# Patient Record
Sex: MALE | HISPANIC OR LATINO | URBAN - METROPOLITAN AREA
[De-identification: names, ages, dates, MRNs, and addresses within clinical notes are randomized per-mention and may not be internally consistent; named-entity substitution may affect disease eponyms.]

---

## 2022-06-20 ENCOUNTER — ANESTHESIA EVENT (OUTPATIENT)
Dept: SURGERY | Facility: HOSPITAL | Age: 37
DRG: 326 | End: 2022-06-20

## 2022-06-20 ENCOUNTER — ANESTHESIA (OUTPATIENT)
Dept: SURGERY | Facility: HOSPITAL | Age: 37
DRG: 326 | End: 2022-06-20

## 2022-06-20 ENCOUNTER — HOSPITAL ENCOUNTER (INPATIENT)
Facility: HOSPITAL | Age: 37
LOS: 3 days | Discharge: HOME | DRG: 326 | End: 2022-06-23
Attending: STUDENT IN AN ORGANIZED HEALTH CARE EDUCATION/TRAINING PROGRAM | Admitting: SURGERY

## 2022-06-20 DIAGNOSIS — R10.9 ABDOMINAL PAIN: ICD-10-CM

## 2022-06-20 DIAGNOSIS — F10.920 ALCOHOLIC INTOXICATION WITHOUT COMPLICATION: ICD-10-CM

## 2022-06-20 DIAGNOSIS — R19.8 PERFORATED VISCUS: Primary | ICD-10-CM

## 2022-06-20 DIAGNOSIS — R10.0 ACUTE ABDOMEN: ICD-10-CM

## 2022-06-20 PROBLEM — K25.1 ACUTE GASTRIC ULCER WITH PERFORATION: Status: ACTIVE | Noted: 2022-06-20

## 2022-06-20 PROBLEM — D72.829 LEUKOCYTOSIS: Status: ACTIVE | Noted: 2022-06-20

## 2022-06-20 PROBLEM — F10.10 ETOH ABUSE: Chronic | Status: ACTIVE | Noted: 2022-06-20

## 2022-06-20 LAB
ABO + RH BLD: NORMAL
ALBUMIN SERPL BCP-MCNC: 4.1 G/DL (ref 3.5–5.2)
ALP SERPL-CCNC: 80 U/L (ref 55–135)
ALT SERPL W/O P-5'-P-CCNC: 19 U/L (ref 10–44)
AMPHET+METHAMPHET UR QL: NEGATIVE
ANION GAP SERPL CALC-SCNC: 14 MMOL/L (ref 8–16)
ANION GAP SERPL CALC-SCNC: 16 MMOL/L (ref 8–16)
APTT BLDCRRT: 24.2 SEC (ref 21–32)
AST SERPL-CCNC: 42 U/L (ref 10–40)
BARBITURATES UR QL SCN>200 NG/ML: NEGATIVE
BASOPHILS # BLD AUTO: 0.05 K/UL (ref 0–0.2)
BASOPHILS NFR BLD: 0.4 % (ref 0–1.9)
BENZODIAZ UR QL SCN>200 NG/ML: NEGATIVE
BILIRUB SERPL-MCNC: 0.7 MG/DL (ref 0.1–1)
BILIRUB UR QL STRIP: NEGATIVE
BLD GP AB SCN CELLS X3 SERPL QL: NORMAL
BUN SERPL-MCNC: 7 MG/DL (ref 6–20)
BUN SERPL-MCNC: 7 MG/DL (ref 6–30)
BZE UR QL SCN: ABNORMAL
CALCIUM SERPL-MCNC: 8.2 MG/DL (ref 8.7–10.5)
CANNABINOIDS UR QL SCN: ABNORMAL
CHLORIDE SERPL-SCNC: 101 MMOL/L (ref 95–110)
CHLORIDE SERPL-SCNC: 104 MMOL/L (ref 95–110)
CLARITY UR: CLEAR
CO2 SERPL-SCNC: 23 MMOL/L (ref 23–29)
COLOR UR: YELLOW
CREAT SERPL-MCNC: 0.8 MG/DL (ref 0.5–1.4)
CREAT SERPL-MCNC: 1.1 MG/DL (ref 0.5–1.4)
CREAT UR-MCNC: 78.9 MG/DL (ref 23–375)
CTP QC/QA: YES
DIFFERENTIAL METHOD: ABNORMAL
EOSINOPHIL # BLD AUTO: 0.2 K/UL (ref 0–0.5)
EOSINOPHIL NFR BLD: 1.1 % (ref 0–8)
ERYTHROCYTE [DISTWIDTH] IN BLOOD BY AUTOMATED COUNT: 13.2 % (ref 11.5–14.5)
EST. GFR  (AFRICAN AMERICAN): >60 ML/MIN/1.73 M^2
EST. GFR  (NON AFRICAN AMERICAN): >60 ML/MIN/1.73 M^2
ETHANOL SERPL-MCNC: 243 MG/DL
GLUCOSE SERPL-MCNC: 107 MG/DL (ref 70–110)
GLUCOSE SERPL-MCNC: 107 MG/DL (ref 70–110)
GLUCOSE UR QL STRIP: NEGATIVE
HCT VFR BLD AUTO: 42.6 % (ref 40–54)
HCT VFR BLD CALC: 45 %PCV (ref 36–54)
HGB BLD-MCNC: 14.6 G/DL (ref 14–18)
HGB UR QL STRIP: NEGATIVE
IMM GRANULOCYTES # BLD AUTO: 0.05 K/UL (ref 0–0.04)
IMM GRANULOCYTES NFR BLD AUTO: 0.4 % (ref 0–0.5)
INR PPP: 1 (ref 0.8–1.2)
KETONES UR QL STRIP: NEGATIVE
LACTATE SERPL-SCNC: 2.7 MMOL/L (ref 0.5–2.2)
LEUKOCYTE ESTERASE UR QL STRIP: NEGATIVE
LIPASE SERPL-CCNC: 19 U/L (ref 4–60)
LYMPHOCYTES # BLD AUTO: 2.9 K/UL (ref 1–4.8)
LYMPHOCYTES NFR BLD: 20.3 % (ref 18–48)
MCH RBC QN AUTO: 33 PG (ref 27–31)
MCHC RBC AUTO-ENTMCNC: 34.3 G/DL (ref 32–36)
MCV RBC AUTO: 96 FL (ref 82–98)
METHADONE UR QL SCN>300 NG/ML: NEGATIVE
MONOCYTES # BLD AUTO: 0.9 K/UL (ref 0.3–1)
MONOCYTES NFR BLD: 6.5 % (ref 4–15)
NEUTROPHILS # BLD AUTO: 10 K/UL (ref 1.8–7.7)
NEUTROPHILS NFR BLD: 71.3 % (ref 38–73)
NITRITE UR QL STRIP: NEGATIVE
NRBC BLD-RTO: 0 /100 WBC
OPIATES UR QL SCN: NEGATIVE
PCP UR QL SCN>25 NG/ML: NEGATIVE
PH UR STRIP: 6 [PH] (ref 5–8)
PLATELET # BLD AUTO: 208 K/UL (ref 150–450)
PMV BLD AUTO: 10.2 FL (ref 9.2–12.9)
POC IONIZED CALCIUM: 1.2 MMOL/L (ref 1.06–1.42)
POC TCO2 (MEASURED): 28 MMOL/L (ref 23–29)
POTASSIUM BLD-SCNC: 3.8 MMOL/L (ref 3.5–5.1)
POTASSIUM SERPL-SCNC: 3.9 MMOL/L (ref 3.5–5.1)
PROCALCITONIN SERPL IA-MCNC: 1.01 NG/ML
PROT SERPL-MCNC: 7.3 G/DL (ref 6–8.4)
PROT UR QL STRIP: NEGATIVE
PROTHROMBIN TIME: 10.4 SEC (ref 9–12.5)
RBC # BLD AUTO: 4.43 M/UL (ref 4.6–6.2)
SAMPLE: NORMAL
SARS-COV-2 RDRP RESP QL NAA+PROBE: NEGATIVE
SODIUM BLD-SCNC: 141 MMOL/L (ref 136–145)
SODIUM SERPL-SCNC: 141 MMOL/L (ref 136–145)
SP GR UR STRIP: >1.03 (ref 1–1.03)
TOXICOLOGY INFORMATION: ABNORMAL
URN SPEC COLLECT METH UR: ABNORMAL
UROBILINOGEN UR STRIP-ACNC: NEGATIVE EU/DL
WBC # BLD AUTO: 14.05 K/UL (ref 3.9–12.7)

## 2022-06-20 PROCEDURE — P9612 CATHETERIZE FOR URINE SPEC: HCPCS

## 2022-06-20 PROCEDURE — 80047 BASIC METABLC PNL IONIZED CA: CPT

## 2022-06-20 PROCEDURE — 80053 COMPREHEN METABOLIC PANEL: CPT | Performed by: STUDENT IN AN ORGANIZED HEALTH CARE EDUCATION/TRAINING PROGRAM

## 2022-06-20 PROCEDURE — 93005 ELECTROCARDIOGRAM TRACING: CPT

## 2022-06-20 PROCEDURE — 63600175 PHARM REV CODE 636 W HCPCS: Performed by: NURSE ANESTHETIST, CERTIFIED REGISTERED

## 2022-06-20 PROCEDURE — 96374 THER/PROPH/DIAG INJ IV PUSH: CPT

## 2022-06-20 PROCEDURE — 85730 THROMBOPLASTIN TIME PARTIAL: CPT | Performed by: EMERGENCY MEDICINE

## 2022-06-20 PROCEDURE — 83605 ASSAY OF LACTIC ACID: CPT | Performed by: EMERGENCY MEDICINE

## 2022-06-20 PROCEDURE — 96375 TX/PRO/DX INJ NEW DRUG ADDON: CPT

## 2022-06-20 PROCEDURE — 63600175 PHARM REV CODE 636 W HCPCS: Performed by: STUDENT IN AN ORGANIZED HEALTH CARE EDUCATION/TRAINING PROGRAM

## 2022-06-20 PROCEDURE — 85610 PROTHROMBIN TIME: CPT | Performed by: EMERGENCY MEDICINE

## 2022-06-20 PROCEDURE — 63600175 PHARM REV CODE 636 W HCPCS: Performed by: SURGERY

## 2022-06-20 PROCEDURE — 11000001 HC ACUTE MED/SURG PRIVATE ROOM

## 2022-06-20 PROCEDURE — 80307 DRUG TEST PRSMV CHEM ANLYZR: CPT | Performed by: STUDENT IN AN ORGANIZED HEALTH CARE EDUCATION/TRAINING PROGRAM

## 2022-06-20 PROCEDURE — 25000003 PHARM REV CODE 250: Performed by: NURSE ANESTHETIST, CERTIFIED REGISTERED

## 2022-06-20 PROCEDURE — 27201423 OPTIME MED/SURG SUP & DEVICES STERILE SUPPLY: Performed by: SURGERY

## 2022-06-20 PROCEDURE — 82077 ASSAY SPEC XCP UR&BREATH IA: CPT | Performed by: STUDENT IN AN ORGANIZED HEALTH CARE EDUCATION/TRAINING PROGRAM

## 2022-06-20 PROCEDURE — 86850 RBC ANTIBODY SCREEN: CPT | Performed by: EMERGENCY MEDICINE

## 2022-06-20 PROCEDURE — 63600175 PHARM REV CODE 636 W HCPCS: Performed by: ANESTHESIOLOGY

## 2022-06-20 PROCEDURE — 81003 URINALYSIS AUTO W/O SCOPE: CPT | Mod: 59 | Performed by: STUDENT IN AN ORGANIZED HEALTH CARE EDUCATION/TRAINING PROGRAM

## 2022-06-20 PROCEDURE — 85025 COMPLETE CBC W/AUTO DIFF WBC: CPT | Performed by: STUDENT IN AN ORGANIZED HEALTH CARE EDUCATION/TRAINING PROGRAM

## 2022-06-20 PROCEDURE — 83690 ASSAY OF LIPASE: CPT | Performed by: STUDENT IN AN ORGANIZED HEALTH CARE EDUCATION/TRAINING PROGRAM

## 2022-06-20 PROCEDURE — 49905 OMENTAL FLAP INTRA-ABDOM: CPT | Mod: ,,, | Performed by: SURGERY

## 2022-06-20 PROCEDURE — 43840 GSTRRPHY SUTR DUOL/GSTR ULCR: CPT | Mod: ,,, | Performed by: SURGERY

## 2022-06-20 PROCEDURE — D9220A PRA ANESTHESIA: ICD-10-PCS | Mod: ,,, | Performed by: ANESTHESIOLOGY

## 2022-06-20 PROCEDURE — 25500020 PHARM REV CODE 255: Performed by: EMERGENCY MEDICINE

## 2022-06-20 PROCEDURE — 63600175 PHARM REV CODE 636 W HCPCS: Performed by: EMERGENCY MEDICINE

## 2022-06-20 PROCEDURE — 84145 PROCALCITONIN (PCT): CPT | Performed by: EMERGENCY MEDICINE

## 2022-06-20 PROCEDURE — 96372 THER/PROPH/DIAG INJ SC/IM: CPT | Mod: 59 | Performed by: EMERGENCY MEDICINE

## 2022-06-20 PROCEDURE — U0002 COVID-19 LAB TEST NON-CDC: HCPCS | Performed by: EMERGENCY MEDICINE

## 2022-06-20 PROCEDURE — D9220A PRA ANESTHESIA: Mod: ,,, | Performed by: ANESTHESIOLOGY

## 2022-06-20 PROCEDURE — 99291 CRITICAL CARE FIRST HOUR: CPT | Mod: 25

## 2022-06-20 PROCEDURE — C1729 CATH, DRAINAGE: HCPCS | Performed by: SURGERY

## 2022-06-20 PROCEDURE — 36000706: Performed by: SURGERY

## 2022-06-20 PROCEDURE — 25000003 PHARM REV CODE 250: Performed by: EMERGENCY MEDICINE

## 2022-06-20 PROCEDURE — 49905 PR OMENTAL FLAP,INTRA-ABDOMINAL: ICD-10-PCS | Mod: ,,, | Performed by: SURGERY

## 2022-06-20 PROCEDURE — 43840 PR REPAIR, PERF DUOD/GAST ULC-WND/INJ: ICD-10-PCS | Mod: ,,, | Performed by: SURGERY

## 2022-06-20 PROCEDURE — 36000707: Performed by: SURGERY

## 2022-06-20 PROCEDURE — 37000009 HC ANESTHESIA EA ADD 15 MINS: Performed by: SURGERY

## 2022-06-20 PROCEDURE — 71000033 HC RECOVERY, INTIAL HOUR: Performed by: SURGERY

## 2022-06-20 PROCEDURE — 37000008 HC ANESTHESIA 1ST 15 MINUTES: Performed by: SURGERY

## 2022-06-20 PROCEDURE — 63600175 PHARM REV CODE 636 W HCPCS: Performed by: HOSPITALIST

## 2022-06-20 PROCEDURE — 96361 HYDRATE IV INFUSION ADD-ON: CPT

## 2022-06-20 PROCEDURE — 25000003 PHARM REV CODE 250: Performed by: STUDENT IN AN ORGANIZED HEALTH CARE EDUCATION/TRAINING PROGRAM

## 2022-06-20 PROCEDURE — 93010 EKG 12-LEAD: ICD-10-PCS | Mod: ,,, | Performed by: INTERNAL MEDICINE

## 2022-06-20 PROCEDURE — 71000039 HC RECOVERY, EACH ADD'L HOUR: Performed by: SURGERY

## 2022-06-20 PROCEDURE — 87040 BLOOD CULTURE FOR BACTERIA: CPT | Mod: 59 | Performed by: EMERGENCY MEDICINE

## 2022-06-20 PROCEDURE — 25000242 PHARM REV CODE 250 ALT 637 W/ HCPCS: Performed by: NURSE ANESTHETIST, CERTIFIED REGISTERED

## 2022-06-20 PROCEDURE — 93010 ELECTROCARDIOGRAM REPORT: CPT | Mod: ,,, | Performed by: INTERNAL MEDICINE

## 2022-06-20 PROCEDURE — 99223 1ST HOSP IP/OBS HIGH 75: CPT | Mod: 57,,, | Performed by: SURGERY

## 2022-06-20 PROCEDURE — 99223 PR INITIAL HOSPITAL CARE,LEVL III: ICD-10-PCS | Mod: 57,,, | Performed by: SURGERY

## 2022-06-20 RX ORDER — PROPOFOL 10 MG/ML
VIAL (ML) INTRAVENOUS
Status: DISCONTINUED | OUTPATIENT
Start: 2022-06-20 | End: 2022-06-20

## 2022-06-20 RX ORDER — SODIUM CHLORIDE, SODIUM LACTATE, POTASSIUM CHLORIDE, CALCIUM CHLORIDE 600; 310; 30; 20 MG/100ML; MG/100ML; MG/100ML; MG/100ML
INJECTION, SOLUTION INTRAVENOUS CONTINUOUS
Status: DISCONTINUED | OUTPATIENT
Start: 2022-06-20 | End: 2022-06-22

## 2022-06-20 RX ORDER — LORAZEPAM 2 MG/ML
1 INJECTION INTRAMUSCULAR EVERY 4 HOURS PRN
Status: DISCONTINUED | OUTPATIENT
Start: 2022-06-20 | End: 2022-06-23

## 2022-06-20 RX ORDER — DEXAMETHASONE SODIUM PHOSPHATE 4 MG/ML
INJECTION, SOLUTION INTRA-ARTICULAR; INTRALESIONAL; INTRAMUSCULAR; INTRAVENOUS; SOFT TISSUE
Status: DISCONTINUED | OUTPATIENT
Start: 2022-06-20 | End: 2022-06-20

## 2022-06-20 RX ORDER — ACETAMINOPHEN 325 MG/1
650 TABLET ORAL EVERY 8 HOURS PRN
Status: DISCONTINUED | OUTPATIENT
Start: 2022-06-20 | End: 2022-06-20

## 2022-06-20 RX ORDER — HYDROMORPHONE HCL IN 0.9% NACL 6 MG/30 ML
PATIENT CONTROLLED ANALGESIA SYRINGE INTRAVENOUS CONTINUOUS
Status: DISCONTINUED | OUTPATIENT
Start: 2022-06-20 | End: 2022-06-20

## 2022-06-20 RX ORDER — NALOXONE HCL 0.4 MG/ML
0.02 VIAL (ML) INJECTION
Status: DISCONTINUED | OUTPATIENT
Start: 2022-06-20 | End: 2022-06-23 | Stop reason: HOSPADM

## 2022-06-20 RX ORDER — FENTANYL CITRATE 50 UG/ML
INJECTION, SOLUTION INTRAMUSCULAR; INTRAVENOUS
Status: DISCONTINUED | OUTPATIENT
Start: 2022-06-20 | End: 2022-06-20

## 2022-06-20 RX ORDER — ROCURONIUM BROMIDE 10 MG/ML
INJECTION, SOLUTION INTRAVENOUS
Status: DISCONTINUED | OUTPATIENT
Start: 2022-06-20 | End: 2022-06-20

## 2022-06-20 RX ORDER — ONDANSETRON 2 MG/ML
INJECTION INTRAMUSCULAR; INTRAVENOUS
Status: DISCONTINUED | OUTPATIENT
Start: 2022-06-20 | End: 2022-06-20

## 2022-06-20 RX ORDER — ONDANSETRON 4 MG/1
8 TABLET, ORALLY DISINTEGRATING ORAL EVERY 8 HOURS PRN
Status: DISCONTINUED | OUTPATIENT
Start: 2022-06-20 | End: 2022-06-20

## 2022-06-20 RX ORDER — SUCCINYLCHOLINE CHLORIDE 20 MG/ML
INJECTION INTRAMUSCULAR; INTRAVENOUS
Status: DISCONTINUED | OUTPATIENT
Start: 2022-06-20 | End: 2022-06-20

## 2022-06-20 RX ORDER — HYDROMORPHONE HYDROCHLORIDE 2 MG/ML
0.5 INJECTION, SOLUTION INTRAMUSCULAR; INTRAVENOUS; SUBCUTANEOUS
Status: COMPLETED | OUTPATIENT
Start: 2022-06-20 | End: 2022-06-20

## 2022-06-20 RX ORDER — ALBUTEROL SULFATE 90 UG/1
AEROSOL, METERED RESPIRATORY (INHALATION)
Status: DISCONTINUED | OUTPATIENT
Start: 2022-06-20 | End: 2022-06-20

## 2022-06-20 RX ORDER — MIDAZOLAM HYDROCHLORIDE 1 MG/ML
INJECTION, SOLUTION INTRAMUSCULAR; INTRAVENOUS
Status: DISCONTINUED | OUTPATIENT
Start: 2022-06-20 | End: 2022-06-20

## 2022-06-20 RX ORDER — FAMOTIDINE 20 MG/1
20 TABLET, FILM COATED ORAL
Status: COMPLETED | OUTPATIENT
Start: 2022-06-20 | End: 2022-06-20

## 2022-06-20 RX ORDER — LIDOCAINE HYDROCHLORIDE 20 MG/ML
INJECTION INTRAVENOUS
Status: DISCONTINUED | OUTPATIENT
Start: 2022-06-20 | End: 2022-06-20

## 2022-06-20 RX ORDER — TALC
6 POWDER (GRAM) TOPICAL NIGHTLY PRN
Status: DISCONTINUED | OUTPATIENT
Start: 2022-06-20 | End: 2022-06-20

## 2022-06-20 RX ORDER — ONDANSETRON 2 MG/ML
4 INJECTION INTRAMUSCULAR; INTRAVENOUS EVERY 6 HOURS PRN
Status: DISCONTINUED | OUTPATIENT
Start: 2022-06-20 | End: 2022-06-23 | Stop reason: HOSPADM

## 2022-06-20 RX ORDER — SODIUM CHLORIDE 0.9 % (FLUSH) 0.9 %
10 SYRINGE (ML) INJECTION
Status: DISCONTINUED | OUTPATIENT
Start: 2022-06-20 | End: 2022-06-20 | Stop reason: HOSPADM

## 2022-06-20 RX ORDER — FLUCONAZOLE 2 MG/ML
200 INJECTION, SOLUTION INTRAVENOUS
Status: DISCONTINUED | OUTPATIENT
Start: 2022-06-20 | End: 2022-06-23

## 2022-06-20 RX ORDER — HALOPERIDOL 5 MG/ML
2 INJECTION INTRAMUSCULAR
Status: COMPLETED | OUTPATIENT
Start: 2022-06-20 | End: 2022-06-20

## 2022-06-20 RX ORDER — ACETAMINOPHEN 10 MG/ML
1000 INJECTION, SOLUTION INTRAVENOUS ONCE
Status: COMPLETED | OUTPATIENT
Start: 2022-06-20 | End: 2022-06-20

## 2022-06-20 RX ORDER — FENTANYL CITRATE 50 UG/ML
25 INJECTION, SOLUTION INTRAMUSCULAR; INTRAVENOUS EVERY 5 MIN PRN
Status: DISCONTINUED | OUTPATIENT
Start: 2022-06-20 | End: 2022-06-20 | Stop reason: HOSPADM

## 2022-06-20 RX ORDER — HYDROMORPHONE HCL IN 0.9% NACL 6 MG/30 ML
PATIENT CONTROLLED ANALGESIA SYRINGE INTRAVENOUS CONTINUOUS
Status: DISCONTINUED | OUTPATIENT
Start: 2022-06-20 | End: 2022-06-23

## 2022-06-20 RX ORDER — ONDANSETRON 2 MG/ML
4 INJECTION INTRAMUSCULAR; INTRAVENOUS
Status: COMPLETED | OUTPATIENT
Start: 2022-06-20 | End: 2022-06-20

## 2022-06-20 RX ORDER — HYDROMORPHONE HYDROCHLORIDE 2 MG/ML
0.2 INJECTION, SOLUTION INTRAMUSCULAR; INTRAVENOUS; SUBCUTANEOUS EVERY 5 MIN PRN
Status: DISCONTINUED | OUTPATIENT
Start: 2022-06-20 | End: 2022-06-20 | Stop reason: HOSPADM

## 2022-06-20 RX ORDER — SODIUM CHLORIDE 0.9 % (FLUSH) 0.9 %
10 SYRINGE (ML) INJECTION
Status: DISCONTINUED | OUTPATIENT
Start: 2022-06-20 | End: 2022-06-23 | Stop reason: HOSPADM

## 2022-06-20 RX ADMIN — FENTANYL CITRATE 25 MCG: 50 INJECTION INTRAMUSCULAR; INTRAVENOUS at 12:06

## 2022-06-20 RX ADMIN — ONDANSETRON 4 MG: 2 INJECTION INTRAMUSCULAR; INTRAVENOUS at 05:06

## 2022-06-20 RX ADMIN — SODIUM CHLORIDE, SODIUM LACTATE, POTASSIUM CHLORIDE, AND CALCIUM CHLORIDE: .6; .31; .03; .02 INJECTION, SOLUTION INTRAVENOUS at 09:06

## 2022-06-20 RX ADMIN — DEXAMETHASONE SODIUM PHOSPHATE 4 MG: 4 INJECTION, SOLUTION INTRAMUSCULAR; INTRAVENOUS at 10:06

## 2022-06-20 RX ADMIN — SUGAMMADEX 200 MG: 100 INJECTION, SOLUTION INTRAVENOUS at 10:06

## 2022-06-20 RX ADMIN — HALOPERIDOL LACTATE 2 MG: 5 INJECTION, SOLUTION INTRAMUSCULAR at 06:06

## 2022-06-20 RX ADMIN — ACETAMINOPHEN 1000 MG: 10 INJECTION INTRAVENOUS at 11:06

## 2022-06-20 RX ADMIN — ROCURONIUM BROMIDE 50 MG: 10 INJECTION, SOLUTION INTRAVENOUS at 10:06

## 2022-06-20 RX ADMIN — PIPERACILLIN AND TAZOBACTAM 4.5 G: 4; .5 INJECTION, POWDER, FOR SOLUTION INTRAVENOUS at 05:06

## 2022-06-20 RX ADMIN — SUCCINYLCHOLINE CHLORIDE 120 MG: 20 INJECTION, SOLUTION INTRAMUSCULAR; INTRAVENOUS at 10:06

## 2022-06-20 RX ADMIN — THIAMINE HYDROCHLORIDE 500 MG: 100 INJECTION, SOLUTION INTRAMUSCULAR; INTRAVENOUS at 09:06

## 2022-06-20 RX ADMIN — Medication: at 01:06

## 2022-06-20 RX ADMIN — GLYCOPYRROLATE 0.2 MG: 0.2 INJECTION, SOLUTION INTRAMUSCULAR; INTRAVITREAL at 10:06

## 2022-06-20 RX ADMIN — IOHEXOL 85 ML: 300 INJECTION, SOLUTION INTRAVENOUS at 06:06

## 2022-06-20 RX ADMIN — SODIUM CHLORIDE 1000 ML: 0.9 INJECTION, SOLUTION INTRAVENOUS at 05:06

## 2022-06-20 RX ADMIN — LIDOCAINE HYDROCHLORIDE 100 MG: 20 INJECTION, SOLUTION INTRAVENOUS at 10:06

## 2022-06-20 RX ADMIN — FENTANYL CITRATE 100 MCG: 50 INJECTION, SOLUTION INTRAMUSCULAR; INTRAVENOUS at 10:06

## 2022-06-20 RX ADMIN — SODIUM CHLORIDE, SODIUM LACTATE, POTASSIUM CHLORIDE, AND CALCIUM CHLORIDE: .6; .31; .03; .02 INJECTION, SOLUTION INTRAVENOUS at 04:06

## 2022-06-20 RX ADMIN — PIPERACILLIN SODIUM AND TAZOBACTAM SODIUM 4.5 G: 4; .5 INJECTION, POWDER, LYOPHILIZED, FOR SOLUTION INTRAVENOUS at 08:06

## 2022-06-20 RX ADMIN — LORAZEPAM 1 MG: 2 INJECTION INTRAMUSCULAR; INTRAVENOUS at 05:06

## 2022-06-20 RX ADMIN — THIAMINE HYDROCHLORIDE 500 MG: 100 INJECTION, SOLUTION INTRAMUSCULAR; INTRAVENOUS at 04:06

## 2022-06-20 RX ADMIN — FAMOTIDINE 20 MG: 20 TABLET ORAL at 06:06

## 2022-06-20 RX ADMIN — PROPOFOL 40 MG: 10 INJECTION, EMULSION INTRAVENOUS at 10:06

## 2022-06-20 RX ADMIN — ONDANSETRON 4 MG: 2 INJECTION, SOLUTION INTRAMUSCULAR; INTRAVENOUS at 10:06

## 2022-06-20 RX ADMIN — HYDROMORPHONE HYDROCHLORIDE 0.5 MG: 2 INJECTION INTRAMUSCULAR; INTRAVENOUS; SUBCUTANEOUS at 05:06

## 2022-06-20 RX ADMIN — PROPOFOL 200 MG: 10 INJECTION, EMULSION INTRAVENOUS at 10:06

## 2022-06-20 RX ADMIN — ALBUTEROL SULFATE 4 PUFF: 90 AEROSOL, METERED RESPIRATORY (INHALATION) at 10:06

## 2022-06-20 RX ADMIN — MIDAZOLAM HYDROCHLORIDE 2 MG: 1 INJECTION, SOLUTION INTRAMUSCULAR; INTRAVENOUS at 09:06

## 2022-06-20 NOTE — ED PROVIDER NOTES
"Encounter Date: 6/20/2022    SCRIBE #1 NOTE: I, Melissa Nikki, am scribing for, and in the presence of, Viola Rojas DO.       History     Chief Complaint   Patient presents with    Abdominal Pain     Pt brought to ED by EMS. Pt c/o generalized abdominal pain without NVD x2 hours. Pt states he drank 2 beers and denies drug/tobacco use. Pt reports no PMHx, NKDA, and no daily medication use.      Jarad Figueroa is a 36 y.o. male, with no PMHx, who presents to the ED with abdominal pain. Patient reports severe, persistent generalized abdominal pain for the last 2h, stating "I can't stand it". No prior episode of symptoms. Patient currently denies EtOH, cigarette, or illicit drug use. No other exacerbating or alleviating factors.  Denies treatment of his symptoms prior to ED arrival.  Denies any urinary symptoms, nausea, vomiting, diarrhea, constipation, chest pain, or shortness of breath at this time. Patient is a poor historian due to acuity of condition. Denies previous surgeries.     Per EMS, patient reported he drank 2 beers. No reported drugs or tobacco use.       The history is provided by the patient. The history is limited by a language barrier and the condition of the patient. A  was used ( [id number]).     Review of patient's allergies indicates:  No Known Allergies  History reviewed. No pertinent past medical history.  History reviewed. No pertinent surgical history.  History reviewed. No pertinent family history.     Review of Systems   Unable to perform ROS: Acuity of condition   Respiratory: Negative for shortness of breath.    Cardiovascular: Negative for chest pain.   Gastrointestinal: Positive for abdominal pain. Negative for constipation, diarrhea, nausea and vomiting.   Genitourinary: Negative for difficulty urinating.       Physical Exam     Initial Vitals [06/20/22 0435]   BP Pulse Resp Temp SpO2   120/70 94 16 97.9 °F (36.6 °C) 98 %      MAP       --  "        Physical Exam    Nursing note and vitals reviewed.  Constitutional: He is not diaphoretic. He appears distressed (due to pain).   Writhing in bed. Ill appearing.    HENT:   Head: Normocephalic and atraumatic.   Eyes: Conjunctivae and EOM are normal. Pupils are equal, round, and reactive to light. No scleral icterus.   Neck: Neck supple. No JVD present.   Normal range of motion.  Cardiovascular: Normal rate, regular rhythm, normal heart sounds and intact distal pulses.   Pulmonary/Chest: Breath sounds normal. No respiratory distress.   Abdominal: Abdomen is soft. He exhibits no distension. There is no abdominal tenderness. There is no rebound and no guarding.   Musculoskeletal:         General: No tenderness or edema. Normal range of motion.      Cervical back: Normal range of motion and neck supple.     Neurological: He is alert and oriented to person, place, and time.   Moves all extremities, follows all commands, no focal neurologic deficits.    Skin: Skin is warm and dry. No rash noted. No erythema.   Psychiatric: He has a normal mood and affect.         ED Course   Procedures  Labs Reviewed   CBC W/ AUTO DIFFERENTIAL - Abnormal; Notable for the following components:       Result Value    WBC 14.05 (*)     RBC 4.43 (*)     MCH 33.0 (*)     Gran # (ANC) 10.0 (*)     Immature Grans (Abs) 0.05 (*)     All other components within normal limits   COMPREHENSIVE METABOLIC PANEL - Abnormal; Notable for the following components:    Calcium 8.2 (*)     AST 42 (*)     All other components within normal limits   URINALYSIS, REFLEX TO URINE CULTURE - Abnormal; Notable for the following components:    Specific Gravity, UA >1.030 (*)     All other components within normal limits    Narrative:     Specimen Source->Urine   DRUG SCREEN PANEL, URINE EMERGENCY - Abnormal; Notable for the following components:    Cocaine (Metab.) Presumptive Positive (*)     THC Presumptive Positive (*)     All other components within normal  limits    Narrative:     Specimen Source->Urine   ALCOHOL,MEDICAL (ETHANOL) - Abnormal; Notable for the following components:    Alcohol, Serum 243 (*)     All other components within normal limits   LACTIC ACID, PLASMA - Abnormal; Notable for the following components:    Lactate (Lactic Acid) 2.7 (*)     All other components within normal limits   CULTURE, BLOOD   CULTURE, BLOOD   LIPASE   APTT   PROTIME-INR   SARS-COV-2 RDRP GENE   ISTAT PROCEDURE   ISTAT CHEM8     EKG Readings: (Independently Interpreted)   Normal sinus rhythm with a rate of 89 beats per minute, left posterior fascicular block, normal axis and intervals, increased baseline artifact.  Nonspecific T-wave inversions in leads V1 through V2.  No obvious acute ST segment changes.  No old EKG in epic to compare.        Imaging Results          X-Ray Chest AP Portable (Final result)  Result time 06/20/22 07:32:51    Final result by Magdi Handy MD (06/20/22 07:32:51)                 Impression:      As above.      Electronically signed by: Magdi Handy  Date:    06/20/2022  Time:    07:32             Narrative:    EXAMINATION:  XR CHEST AP PORTABLE    CLINICAL HISTORY:  preop;    TECHNIQUE:  Single frontal view of the chest was performed.    COMPARISON:  Same day CT of the abdomen and pelvis.    FINDINGS:  Monitoring leads overlie the chest.  Grossly normal cardiomediastinal contours.  Lungs with some suggestion of dependent atelectasis.  No definite pneumothorax or large volume pleural effusion.    Suggested free air under the right hemidiaphragm in keeping with pneumoperitoneum established on prior CT imaging.  Osseous and soft tissue structures appear without definite acute abnormality.                                CT Abdomen Pelvis With Contrast (Final result)  Result time 06/20/22 06:56:45    Final result by Magdi Handy MD (06/20/22 06:56:45)                 Impression:      1. Pneumoperitoneum, portal venous gas, and free fluid  overall concerning for perforated viscus.  The etiology of these findings is not definitively established on this examination.  The free air does appear to be somewhat concentrated in the upper abdomen about the left hepatic lobe and stomach raising the possibility of upper gastrointestinal source.  Clinical correlation and surgical consultation advised.  2. Additional details, as provided in the body of report.  This report was flagged in Epic as abnormal.      Electronically signed by: Magdi Handy  Date:    06/20/2022  Time:    06:56             Narrative:    EXAMINATION:  CT ABDOMEN PELVIS WITH CONTRAST    CLINICAL HISTORY:  Abdominal pain, acute, nonlocalized;    TECHNIQUE:  Low dose axial images, sagittal and coronal reformations were obtained from the lung bases to the pubic symphysis following the IV administration of 85 mL of Omnipaque 350    COMPARISON:  None.    FINDINGS:  Lower chest: Dependent atelectasis is noted.  Nonspecific vague ground-glass type attenuation additionally noted in the visualized lower lungs.    Liver: Unremarkable.  Small volume non dependent portal venous gas is suggested.    Gallbladder and bile ducts: Unremarkable. No biliary ductal dilatation.    Pancreas: Unremarkable.    Spleen: Unremarkable.    Adrenals: Unremarkable.    Kidneys: Unremarkable.    Lymph nodes: No abdominal or pelvic lymphadenopathy.    Bowel and mesentery: Small amount of nonspecific fluid noted within the visualized distal esophagus.  Nonspecific gastric wall thickening is identified.Appendix not clearly seen.    Abdominal aorta: Unremarkable.    Inferior vena cava: Unremarkable.    Free fluid or free air: Pneumoperitoneum is noted, somewhat most pronounced in the upper abdomen about the left hepatic lobe and stomach.  Small volume free fluid is additionally noted, collecting dependently within the pelvis.    Pelvis: Unremarkable.    Body wall: Unremarkable.    Bones: Unremarkable.                                  Medications   sodium chloride 0.9% flush 10 mL (has no administration in time range)   ondansetron disintegrating tablet 8 mg (has no administration in time range)   melatonin tablet 6 mg (has no administration in time range)   acetaminophen tablet 650 mg (has no administration in time range)   piperacillin-tazobactam 4.5 g in dextrose 5 % 100 mL IVPB (ready to mix system) (has no administration in time range)   fluconazole (DIFLUCAN) IVPB 200 mg (has no administration in time range)   lactated ringers infusion (has no administration in time range)   thiamine (B-1) 500 mg in dextrose 5 % 100 mL IVPB (has no administration in time range)   folic acid 1 mg in sodium chloride 0.9% 100 mL IVPB (has no administration in time range)   sodium chloride 0.9% bolus 1,000 mL (0 mLs Intravenous Stopped 6/20/22 0753)   HYDROmorphone (PF) injection 0.5 mg (0.5 mg Intravenous Given 6/20/22 0513)   ondansetron injection 4 mg (4 mg Intravenous Given 6/20/22 0514)   iohexoL (OMNIPAQUE 300) injection 85 mL (85 mLs Intravenous Given 6/20/22 0632)   haloperidol lactate injection 2 mg (2 mg Intramuscular Given 6/20/22 0644)   famotidine tablet 20 mg (20 mg Oral Given 6/20/22 0644)   piperacillin-tazobactam 4.5 g in dextrose 5 % 100 mL IVPB (ready to mix system) (4.5 g Intravenous New Bag 6/20/22 0802)     Medical Decision Making:   History:   Old Medical Records: I decided to obtain old medical records.  Clinical Tests:   Lab Tests: Ordered and Reviewed  Radiological Study: Ordered and Reviewed  Medical Tests: Ordered and Reviewed  ED Management:   [facility]  This is an emergent evaluation of a 36 y.o. male, with no PMHx, who presents to the ED with diffuse, severe abdominal pain. Initial vitals in the ED unremarkable. Physical exam noted above. DDx includes but is not limited to alcohol intoxication, pancreatitis, hepatitis, bowel obstruction, bowel perforation, appendicitis, colitis, diverticular disease, UTI, kidney stone. Also  considered but clinically less likely to be ACS, stroke, occult trauma. Labs and imaging including abdominal pain workup, ethanol level, UDS, EKG and CT abdomen pelvis with contrast was obtained. Will also provide IV pain medication, IV zofran and fluids. Will continue to monitor and frequently reassess pending results of labs, treatments and final disposition. Patient is aware of plan and is amenable.     Viola Rojas D.O  EMERGENCY MEDICINE  5:55 AM 06/20/2022  Patient and CT on sign-out.  Patient come in with abdominal pain and alcohol abuse.  Will reassess patient went back from CT.    On reassessment patient has tenderness in the epigastrium.  Pending CT results.  Elevated alcohol level.  Vital signs show normal blood pressure and pulse in the 90s.    Abnormal CT.  Will contact General surgery.  Preop labs along with antibiotics and blood cultures ordered for patient's.    7:18 AM  Spoke with patient and discussed with general surgery.   used whose number is [id number]    Patient taken to the operating room.  Admitted to surgery.  Hospitalist Consult placed and I contacted Dr. Nicole.    Please put in 35 minutes of critical care due to patient having a high risk of cardiavascular/abdominal failure.   Separate from teaching and exclusive of procedure and ekg time  Includes:  Time at bedside  Time reviewing test results  Time discussing case with staff  Time documenting the medical record  Time spent with family members  Time spent with consults  Management            Scribe Attestation:   Scribe #1: I performed the above scribed service and the documentation accurately describes the services I performed. I attest to the accuracy of the note.                 Clinical Impression:   Final diagnoses:  [R10.9] Abdominal pain  [R19.8] Perforated viscus (Primary)  [F10.920] Alcoholic intoxication without complication          ED Disposition Condition    Admit        I, Nasir Lcokwood MD,  personally performed the services described in this documentation. All medical record entries made by the scribe were at my direction and in my presence. I have reviewed the chart and agree that the record reflects my personal performance and is accurate and complete.          Nasir Lockwood MD  06/20/22 0918

## 2022-06-20 NOTE — TRANSFER OF CARE
"Anesthesia Transfer of Care Note    Patient: Tj Figueroa    Procedure(s) Performed: Procedure(s) (LRB):  LAPAROTOMY, EXPLORATORY (N/A)    Patient location: PACU    Anesthesia Type: general    Transport from OR: Transported from OR on room air with adequate spontaneous ventilation    Post pain: adequate analgesia    Post assessment: no apparent anesthetic complications    Post vital signs: stable    Level of consciousness: sedated and responds to stimulation    Nausea/Vomiting: no nausea/vomiting    Complications: none    Transfer of care protocol was followed      Last vitals:   Visit Vitals  /88 (BP Location: Right arm, Patient Position: Lying)   Pulse (!) 117   Temp 37.3 °C (99.1 °F) (Oral)   Resp 18   Ht 5' 10" (1.778 m)   Wt 61.7 kg (136 lb)   SpO2 100%   BMI 19.51 kg/m²     "

## 2022-06-20 NOTE — ANESTHESIA PREPROCEDURE EVALUATION
06/20/2022  Tj Figueroa is a 36 y.o., male.  Pre-operative evaluation for Procedure(s) (LRB):  LAPAROTOMY, EXPLORATORY (N/A)    NPO unkn  METS unkn    Intoxicated on arrival to ED this AM and  somnolent after haldol. Unable to obtain history or get informed consent, no family or  per pt. Emergency consent (2 physician with Dr. Payan) prior to emergency procedure.    Vitals:    06/20/22 0515   BP: (!) 141/84   Pulse: 94   Resp: (!) 21   Temp:          There is no problem list on file for this patient.      Review of patient's allergies indicates:  No Known Allergies    No current facility-administered medications on file prior to encounter.     No current outpatient medications on file prior to encounter.       History reviewed. No pertinent surgical history.    Social History     Socioeconomic History    Marital status: Unknown         CBC:   Recent Labs     06/20/22  0511 06/20/22  0523   WBC  --  14.05*   RBC  --  4.43*   HGB  --  14.6   HCT 45 42.6   PLT  --  208   MCV  --  96   MCH  --  33.0*   MCHC  --  34.3       CMP:   Recent Labs     06/20/22  0523      K 3.9      CO2 23   BUN 7   CREATININE 0.8      CALCIUM 8.2*   ALBUMIN 4.1   PROT 7.3   ALKPHOS 80   ALT 19   AST 42*   BILITOT 0.7       INR  No results for input(s): PT, INR, PROTIME, APTT in the last 72 hours.    ETOH, THC, and COCAINE + on UDS 6/20/22 at 0523    Diagnostic Studies:  XR    FINDINGS:  Monitoring leads overlie the chest.  Grossly normal cardiomediastinal contours.  Lungs with some suggestion of dependent atelectasis.  No definite pneumothorax or large volume pleural effusion.     Suggested free air under the right hemidiaphragm in keeping with pneumoperitoneum established on prior CT imaging.  Osseous and soft tissue structures appear without definite acute abnormality.     Impression:     As  above.  CT abd  Impression:     1. Pneumoperitoneum, portal venous gas, and free fluid overall concerning for perforated viscus.  The etiology of these findings is not definitively established on this examination.  The free air does appear to be somewhat concentrated in the upper abdomen about the left hepatic lobe and stomach raising the possibility of upper gastrointestinal source.  Clinical correlation and surgical consultation advised.  2. Additional details, as provided in the body of report.  This report was flagged in Epic as abnormal.    EKD Echo:  No results found for this or any previous visit.        Pre-op Assessment    I have reviewed the Patient Summary Reports.     I have reviewed the Nursing Notes. I have reviewed the NPO Status.   I have reviewed the Medications.     Review of Systems  Anesthesia Hx:  No previous Anesthesia  Neg history of prior surgery. Denies Family Hx of Anesthesia complications.    Social:  Smoker, Alcohol Use Cocaine + on UDS   Hematology/Oncology:  Hematology Normal   Oncology Normal     EENT/Dental:EENT/Dental Normal   Cardiovascular:  Cardiovascular Normal Exercise tolerance: good     Pulmonary:  Pulmonary Normal    Renal/:  Renal/ Normal     Hepatic/GI:   abd perforation   Neurological:  Neurology Normal    Endocrine:  Endocrine Normal        Physical Exam  General: Cooperative, Alert and Oriented        Anesthesia Plan  Type of Anesthesia, risks & benefits discussed:    Anesthesia Type: Gen ETT  Intra-op Monitoring Plan: Standard ASA Monitors  Post Op Pain Control Plan: multimodal analgesia  Induction:  rapid sequence and IV  Airway Plan: Video, Post-Induction  Informed Consent: Informed consent signed with the Patient and all parties understand the risks and agree with anesthesia plan.  All questions answered. Patient consented to blood products? Yes  ASA Score: 3 Emergent  Day of Surgery Review of History & Physical: H&P Update referred to the  surgeon/provider.  Anesthesia Plan Notes: Emergency consent with Dr. Payan required.    Ready For Surgery From Anesthesia Perspective.     .

## 2022-06-20 NOTE — HPI
"35 y/o male presented to the ER with abdominal pain.  Patient reported severe, persistent generalized abdominal pain for the last 2 hours.  Patient stating "I can't stand it".  No similar episodes in the past.  No alleviating or aggravating factors.  Patient admits to drinking a couple of beers prior to symptoms starting.  ETOH level of 243 on presentation.  CT showing pneumoperitoneum.  Surgery evaluated patient and taken for exp lap.  Hospital Medicine consulted for medical management and concern about possible alcohol withdrawal.  "

## 2022-06-20 NOTE — ANESTHESIA POSTPROCEDURE EVALUATION
Anesthesia Post Evaluation    Patient: Tj Figueroa    Procedure(s) Performed: Procedure(s) (LRB):  LAPAROTOMY, EXPLORATORY (N/A)    Final Anesthesia Type: general      Patient location during evaluation: PACU  Patient participation: Yes- Able to Participate  Level of consciousness: awake and alert, oriented and awake  Post-procedure vital signs: reviewed and stable  Airway patency: patent    PONV status at discharge: No PONV  Anesthetic complications: no      Cardiovascular status: blood pressure returned to baseline  Respiratory status: unassisted, spontaneous ventilation and room air  Hydration status: euvolemic  Follow-up not needed.          Vitals Value Taken Time   /80 06/20/22 1201   Temp 37.3 °C (99.1 °F) 06/20/22 1120   Pulse 102 06/20/22 1213   Resp 26 06/20/22 1213   SpO2 97 % 06/20/22 1213   Vitals shown include unvalidated device data.      No case tracking events are documented in the log.      Pain/Vicente Score: Pain Rating Prior to Med Admin: 7 (6/20/2022 11:28 AM)

## 2022-06-20 NOTE — PLAN OF CARE
Pt admitted to medsurg unit.  utilized to do admit. IVF's infusing. PCA pump explained to pt using . JAXON drain to bulb suction. Pr oriented to room bed in lowest locked position. Plan of care in place.   Problem: Adult Inpatient Plan of Care  Goal: Plan of Care Review  Outcome: Ongoing, Progressing  Goal: Patient-Specific Goal (Individualized)  Outcome: Ongoing, Progressing  Goal: Absence of Hospital-Acquired Illness or Injury  Outcome: Ongoing, Progressing  Goal: Optimal Comfort and Wellbeing  Outcome: Ongoing, Progressing  Goal: Readiness for Transition of Care  Outcome: Ongoing, Progressing     Problem: Infection  Goal: Absence of Infection Signs and Symptoms  Outcome: Ongoing, Progressing     Problem: Skin Injury Risk Increased  Goal: Skin Health and Integrity  Outcome: Ongoing, Progressing

## 2022-06-20 NOTE — NURSING
[facility] Nurses Note -- 4 Eyes      2022   6:44 PM      Skin assessed durin2022    [x] No Pressure Injuries Present    []Prevention Measures Documented      [] Yes- Altered Skin Integrity Present or Discovered   [] LDA Added if Not in Epic (Describe Wound)   [] New Altered Skin Integrity was Present on Admit and Documented in LDA   [] Wound Image Taken      Attending RN:  Jessy Lechuga RN     Second RN/Staff Member: Georgina Cody RN

## 2022-06-20 NOTE — OP NOTE
[facility] [place] - Surgery  Operative Note    SUMMARY     Surgery Date: 6/20/2022     Surgeon(s) and Role:     * Meng Payan MD - Primary    Assisting Surgeon: Jolene Mauro    Pre-op Diagnosis:  Acute abdomen [R10.0]    Post-op Diagnosis:  Post-Op Diagnosis Codes:   perforated gastric ulcer    Procedure(s) (LRB):  LAPAROTOMY, EXPLORATORY (N/A)  Kan patch repair of perforated gastric ulcer    Anesthesia: General    Indication for procedure: Tj Figueroa is 36 y.o. male with  Difficult to obtain history due to acute intoxication presenting to the emergency room with acute abdominal pain peritonitis and a CT scan demonstrating free air in the abdomen consistent with perforated abdominal viscus organ likely a gastric ulcer. After discussion of disease process, we discussed options and have elected for operation to  Perform an exploratory laparotomy and any indicated procedures.    Description of Procedure: After consent was obtained, patient was taken to the OR. The abdomen was prepped and draped in a standard sterile fashion after general anesthesia was started. Time out was performed. Antibiotics were started with zosyn. We began the procedure by made an upper midline laparotomy. We entered the fascia. We immediately noted a prepyloric gastric ulcer. We irrigated the spilled turbid fluid. There was no associated mass with the ulcer which was 5 mm. The liver appeared healthy. We used interrupted 3-0 vicryl sutures to take a tongue of omentum over the ulcer as a Kan Patch.  We placed an NGT and verified its location.  We placed a 10 flat LEATHA drain from the RUQ to sit over the ulcer. We sutured the leatha drain with a silk suture. We closed the fascia with a #1 loop PDS. We closed the skin with staples. This was covered with sterile dry dressing.  All counts were correct x2. Patient was awakened from anesthesia and taken to the recovery room in a stable condition having suffered no issues at this time.    Description  of the findings of the procedure: 5 mm perforated gastric ulcer, prepyloric. Kan patch    Estimated Blood Loss: * No values recorded between 6/20/2022 10:25 AM and 6/20/2022 11:03 AM *         Specimens:   Specimen (24h ago, onward)            None        Drains/Implants: 10 flat leatha drain placed

## 2022-06-20 NOTE — CONSULTS
"[facility] Medicine  Consult Note    Patient Name: Tj Figueroa  MRN: [id number]  Admission Date: 6/20/2022  Hospital Length of Stay: 0 days  Attending Physician: Meng Payan MD   Primary Care Provider: Primary Doctor No           Patient information was obtained from patient and ER records.     Consults  Subjective:     Principal Problem: Acute gastric ulcer with perforation    Chief Complaint:   Chief Complaint   Patient presents with    Abdominal Pain     Pt brought to ED by EMS. Pt c/o generalized abdominal pain without NVD x2 hours. Pt states he drank 2 beers and denies drug/tobacco use. Pt reports no PMHx, NKDA, and no daily medication use.         HPI: 35 y/o male presented to the ER with abdominal pain.  Patient reported severe, persistent generalized abdominal pain for the last 2 hours.  Patient stating "I can't stand it".  No similar episodes in the past.  No alleviating or aggravating factors.  Patient admits to drinking a couple of beers prior to symptoms starting.  ETOH level of 243 on presentation.  CT showing pneumoperitoneum.  Surgery evaluated patient and taken for exp lap.  Hospital Medicine consulted for medical management and concern about possible alcohol withdrawal.      History reviewed. No pertinent past medical history.    History reviewed. No pertinent surgical history.    Review of patient's allergies indicates:  No Known Allergies    No current facility-administered medications on file prior to encounter.     No current outpatient medications on file prior to encounter.     Family History    None       Tobacco Use    Smoking status: Not on file    Smokeless tobacco: Not on file   Substance and Sexual Activity    Alcohol use: Not on file    Drug use: Not on file    Sexual activity: Not on file     Review of Systems   Constitutional:  Negative for chills and fever.   HENT:  Negative for ear discharge and ear pain.    Eyes:  Negative for discharge and itching. "   Respiratory:  Negative for cough and shortness of breath.    Cardiovascular:  Negative for chest pain and leg swelling.   Gastrointestinal:  Positive for abdominal distention and abdominal pain.   Endocrine: Negative for cold intolerance and heat intolerance.   Genitourinary:  Negative for difficulty urinating and dysuria.   Musculoskeletal:  Negative for neck pain and neck stiffness.   Skin:  Negative for rash and wound.   Neurological:  Negative for seizures and syncope.   Psychiatric/Behavioral:  Negative for agitation and hallucinations.    Objective:     Vital Signs (Most Recent):  Temp: 98.1 °F (36.7 °C) (06/20/22 1657)  Pulse: 80 (06/20/22 1657)  Resp: 16 (06/20/22 1657)  BP: (!) 143/99 (06/20/22 1657)  SpO2: 100 % (06/20/22 1657)   Vital Signs (24h Range):  Temp:  [97.9 °F (36.6 °C)-99.1 °F (37.3 °C)] 98.1 °F (36.7 °C)  Pulse:  [] 80  Resp:  [15-24] 16  SpO2:  [94 %-100 %] 100 %  BP: (120-144)/(70-99) 143/99     Weight: 61.7 kg (136 lb)  Body mass index is 19.51 kg/m².    Physical Exam  Constitutional:       Appearance: He is ill-appearing. He is not diaphoretic.   HENT:      Head: Normocephalic and atraumatic.      Mouth/Throat:      Mouth: Mucous membranes are dry.      Pharynx: No oropharyngeal exudate or posterior oropharyngeal erythema.   Cardiovascular:      Rate and Rhythm: Regular rhythm. Tachycardia present.   Pulmonary:      Effort: No respiratory distress.      Breath sounds: Normal breath sounds.   Abdominal:      Palpations: Abdomen is soft.      Tenderness: There is abdominal tenderness.   Musculoskeletal:         General: No deformity or signs of injury.   Skin:     General: Skin is warm and dry.   Neurological:      Mental Status: He is oriented to person, place, and time.      Cranial Nerves: No cranial nerve deficit.       Significant Labs: All pertinent labs within the past 24 hours have been reviewed.  BMP:   Recent Labs   Lab 06/20/22  0523         K 3.9       CO2 23   BUN 7   CREATININE 0.8   CALCIUM 8.2*     CBC:   Recent Labs   Lab 06/20/22  0511 06/20/22  0523   WBC  --  14.05*   HGB  --  14.6   HCT 45 42.6   PLT  --  208       Significant Imaging: I have reviewed all pertinent imaging results/findings within the past 24 hours.    Assessment/Plan:     * Acute gastric ulcer with perforation  Pneumoperitoneum on CT.  Admitted to General Surgery.  Exp lap today.      Leukocytosis  Secondary to above.      ETOH abuse  Patient seems to downplay his ETOH intake.  Alcohol level of 243 on presentation.  Started on thiamine and folate.  Start scheduled Valium with any signs of withdrawal.  IV Ativan prn withdrawal symptoms.        VTE Risk Mitigation (From admission, onward)         Ordered     IP VTE HIGH RISK PATIENT  Once         06/20/22 0842     Place sequential compression device  Until discontinued         06/20/22 0842                    Thank you for your consult. I will follow-up with patient. Please contact us if you have any additional questions.    Clemente Nicole MD  Department of Hospital Medicine   [facility]

## 2022-06-20 NOTE — ANESTHESIA PROCEDURE NOTES
Intubation    Date/Time: 6/20/2022 10:08 AM  Performed by: Skip Michelle CRNA  Authorized by: Nelly Horton MD     Intubation:     Induction:  Intravenous    Intubated:  Postinduction    Mask Ventilation:  N/a    Attempts:  1    Attempted By:  CRNA    Method of Intubation:  Video laryngoscopy    Blade:  Paez 3    Laryngeal View Grade: Grade I - full view of cords      Difficult Airway Encountered?: No      Complications:  None    Airway Device:  Oral endotracheal tube    Airway Device Size:  7.5    Style/Cuff Inflation:  Cuffed    Tube secured:  23    Secured at:  The lips    Placement Verified By:  Capnometry    Complicating Factors:  None    Findings Post-Intubation:  BS equal bilateral

## 2022-06-20 NOTE — SUBJECTIVE & OBJECTIVE
History reviewed. No pertinent past medical history.    History reviewed. No pertinent surgical history.    Review of patient's allergies indicates:  No Known Allergies    No current facility-administered medications on file prior to encounter.     No current outpatient medications on file prior to encounter.     Family History    None       Tobacco Use    Smoking status: Not on file    Smokeless tobacco: Not on file   Substance and Sexual Activity    Alcohol use: Not on file    Drug use: Not on file    Sexual activity: Not on file     Review of Systems   Constitutional:  Negative for chills and fever.   HENT:  Negative for ear discharge and ear pain.    Eyes:  Negative for discharge and itching.   Respiratory:  Negative for cough and shortness of breath.    Cardiovascular:  Negative for chest pain and leg swelling.   Gastrointestinal:  Positive for abdominal distention and abdominal pain.   Endocrine: Negative for cold intolerance and heat intolerance.   Genitourinary:  Negative for difficulty urinating and dysuria.   Musculoskeletal:  Negative for neck pain and neck stiffness.   Skin:  Negative for rash and wound.   Neurological:  Negative for seizures and syncope.   Psychiatric/Behavioral:  Negative for agitation and hallucinations.    Objective:     Vital Signs (Most Recent):  Temp: 98.1 °F (36.7 °C) (06/20/22 1657)  Pulse: 80 (06/20/22 1657)  Resp: 16 (06/20/22 1657)  BP: (!) 143/99 (06/20/22 1657)  SpO2: 100 % (06/20/22 1657)   Vital Signs (24h Range):  Temp:  [97.9 °F (36.6 °C)-99.1 °F (37.3 °C)] 98.1 °F (36.7 °C)  Pulse:  [] 80  Resp:  [15-24] 16  SpO2:  [94 %-100 %] 100 %  BP: (120-144)/(70-99) 143/99     Weight: 61.7 kg (136 lb)  Body mass index is 19.51 kg/m².    Physical Exam  Constitutional:       Appearance: He is ill-appearing. He is not diaphoretic.   HENT:      Head: Normocephalic and atraumatic.      Mouth/Throat:      Mouth: Mucous membranes are dry.      Pharynx: No oropharyngeal exudate or  posterior oropharyngeal erythema.   Cardiovascular:      Rate and Rhythm: Regular rhythm. Tachycardia present.   Pulmonary:      Effort: No respiratory distress.      Breath sounds: Normal breath sounds.   Abdominal:      Palpations: Abdomen is soft.      Tenderness: There is abdominal tenderness.   Musculoskeletal:         General: No deformity or signs of injury.   Skin:     General: Skin is warm and dry.   Neurological:      Mental Status: He is oriented to person, place, and time.      Cranial Nerves: No cranial nerve deficit.       Significant Labs: All pertinent labs within the past 24 hours have been reviewed.  BMP:   Recent Labs   Lab 06/20/22 0523         K 3.9      CO2 23   BUN 7   CREATININE 0.8   CALCIUM 8.2*     CBC:   Recent Labs   Lab 06/20/22  0511 06/20/22 0523   WBC  --  14.05*   HGB  --  14.6   HCT 45 42.6   PLT  --  208       Significant Imaging: I have reviewed all pertinent imaging results/findings within the past 24 hours.

## 2022-06-20 NOTE — ASSESSMENT & PLAN NOTE
I was notified after patient was discharged that home health would not be available to see patient at his home until 1/31/2022.  Patient should be seen by home health at earliest available time.   Patient seems to downplay his ETOH intake.  Alcohol level of 243 on presentation.  Started on thiamine and folate.  Start scheduled Valium with any signs of withdrawal.  IV Ativan prn withdrawal symptoms.

## 2022-06-20 NOTE — H&P
Surgery Consult Note     Chief complaint:  Abdominal pain    HPI: 35 yo who came in with abdominal pain. Attempted to obtain history with  however history was unable to be obtained 2/2 intoxication so all information was obtained from chart review.     History reviewed. No pertinent past medical history.    History reviewed. No pertinent surgical history.    No current facility-administered medications on file prior to encounter.     No current outpatient medications on file prior to encounter.       Social History     Socioeconomic History    Marital status: Unknown       Review of patient's allergies indicates:  No Known Allergies    History reviewed. No pertinent family history.    ROS:unable to obtain 2/2 altered mental status     Objective:    Temp:  [97.9 °F (36.6 °C)] 97.9 °F (36.6 °C)  Pulse:  [] 100  Resp:  [15-24] 15  SpO2:  [96 %-98 %] 96 %  BP: (120-141)/(70-84) 126/81     General: NAD, asleep  HEENT: EOMI, mmm  CV: regular rate, warm and well perfused  Pulm: symmetric expansion, no distress  Abdomen: soft, nontender, nondistended, no rebound, no guarding  Extremities:  no cyanosis or edema  Skin: dry intact  Neuro: wakes to sternal rub, moves all extremiteis    WBC/Hgb/Hct/Plts:  14.05/14.6/42.6/208 (06/20 0523)  BUN/Cr/glu/ALT/AST/amyl/lip:  7/0.8/--/19/42/--/19 (06/20 0523)    Imaging Results          X-Ray Chest AP Portable (Final result)  Result time 06/20/22 07:32:51    Final result by Magdi Handy MD (06/20/22 07:32:51)                 Impression:      As above.      Electronically signed by: Magdi Handy  Date:    06/20/2022  Time:    07:32             Narrative:    EXAMINATION:  XR CHEST AP PORTABLE    CLINICAL HISTORY:  preop;    TECHNIQUE:  Single frontal view of the chest was performed.    COMPARISON:  Same day CT of the abdomen and pelvis.    FINDINGS:  Monitoring leads overlie the chest.  Grossly normal cardiomediastinal contours.  Lungs with some suggestion of  dependent atelectasis.  No definite pneumothorax or large volume pleural effusion.    Suggested free air under the right hemidiaphragm in keeping with pneumoperitoneum established on prior CT imaging.  Osseous and soft tissue structures appear without definite acute abnormality.                                CT Abdomen Pelvis With Contrast (Final result)  Result time 06/20/22 06:56:45    Final result by Magdi Handy MD (06/20/22 06:56:45)                 Impression:      1. Pneumoperitoneum, portal venous gas, and free fluid overall concerning for perforated viscus.  The etiology of these findings is not definitively established on this examination.  The free air does appear to be somewhat concentrated in the upper abdomen about the left hepatic lobe and stomach raising the possibility of upper gastrointestinal source.  Clinical correlation and surgical consultation advised.  2. Additional details, as provided in the body of report.  This report was flagged in Epic as abnormal.      Electronically signed by: Magdi Handy  Date:    06/20/2022  Time:    06:56             Narrative:    EXAMINATION:  CT ABDOMEN PELVIS WITH CONTRAST    CLINICAL HISTORY:  Abdominal pain, acute, nonlocalized;    TECHNIQUE:  Low dose axial images, sagittal and coronal reformations were obtained from the lung bases to the pubic symphysis following the IV administration of 85 mL of Omnipaque 350    COMPARISON:  None.    FINDINGS:  Lower chest: Dependent atelectasis is noted.  Nonspecific vague ground-glass type attenuation additionally noted in the visualized lower lungs.    Liver: Unremarkable.  Small volume non dependent portal venous gas is suggested.    Gallbladder and bile ducts: Unremarkable. No biliary ductal dilatation.    Pancreas: Unremarkable.    Spleen: Unremarkable.    Adrenals: Unremarkable.    Kidneys: Unremarkable.    Lymph nodes: No abdominal or pelvic lymphadenopathy.    Bowel and mesentery: Small amount of  nonspecific fluid noted within the visualized distal esophagus.  Nonspecific gastric wall thickening is identified.Appendix not clearly seen.    Abdominal aorta: Unremarkable.    Inferior vena cava: Unremarkable.    Free fluid or free air: Pneumoperitoneum is noted, somewhat most pronounced in the upper abdomen about the left hepatic lobe and stomach.  Small volume free fluid is additionally noted, collecting dependently within the pelvis.    Pelvis: Unremarkable.    Body wall: Unremarkable.    Bones: Unremarkable.                                Assessment/ Plan: 35 yo male who presents with abdominal pain. Imaging shows free air with concern for perforated viscus    To OR for emergent ex lap, Kan patch, possible gastric or intestinal resection, possible reconstruction, possible drainage procedure, any indicated procedure  2 physician emergency consent obtained 2/2 patient severely intoxicated and no emergency contacts    Nj RAMOS  Surgery PGY5

## 2022-06-21 LAB
ALBUMIN SERPL BCP-MCNC: 3 G/DL (ref 3.5–5.2)
ALP SERPL-CCNC: 65 U/L (ref 55–135)
ALT SERPL W/O P-5'-P-CCNC: 15 U/L (ref 10–44)
ANION GAP SERPL CALC-SCNC: 9 MMOL/L (ref 8–16)
AST SERPL-CCNC: 29 U/L (ref 10–40)
BASOPHILS # BLD AUTO: 0.01 K/UL (ref 0–0.2)
BASOPHILS NFR BLD: 0.1 % (ref 0–1.9)
BILIRUB SERPL-MCNC: 1.6 MG/DL (ref 0.1–1)
BUN SERPL-MCNC: 7 MG/DL (ref 6–20)
CALCIUM SERPL-MCNC: 8.5 MG/DL (ref 8.7–10.5)
CHLORIDE SERPL-SCNC: 103 MMOL/L (ref 95–110)
CO2 SERPL-SCNC: 26 MMOL/L (ref 23–29)
CREAT SERPL-MCNC: 0.7 MG/DL (ref 0.5–1.4)
DIFFERENTIAL METHOD: ABNORMAL
EOSINOPHIL # BLD AUTO: 0 K/UL (ref 0–0.5)
EOSINOPHIL NFR BLD: 0.2 % (ref 0–8)
ERYTHROCYTE [DISTWIDTH] IN BLOOD BY AUTOMATED COUNT: 13.2 % (ref 11.5–14.5)
EST. GFR  (AFRICAN AMERICAN): >60 ML/MIN/1.73 M^2
EST. GFR  (NON AFRICAN AMERICAN): >60 ML/MIN/1.73 M^2
GLUCOSE SERPL-MCNC: 111 MG/DL (ref 70–110)
HCT VFR BLD AUTO: 38 % (ref 40–54)
HGB BLD-MCNC: 12.7 G/DL (ref 14–18)
IMM GRANULOCYTES # BLD AUTO: 0.03 K/UL (ref 0–0.04)
IMM GRANULOCYTES NFR BLD AUTO: 0.3 % (ref 0–0.5)
LYMPHOCYTES # BLD AUTO: 1 K/UL (ref 1–4.8)
LYMPHOCYTES NFR BLD: 9.6 % (ref 18–48)
MAGNESIUM SERPL-MCNC: 2 MG/DL (ref 1.6–2.6)
MCH RBC QN AUTO: 32.2 PG (ref 27–31)
MCHC RBC AUTO-ENTMCNC: 33.4 G/DL (ref 32–36)
MCV RBC AUTO: 96 FL (ref 82–98)
MONOCYTES # BLD AUTO: 0.9 K/UL (ref 0.3–1)
MONOCYTES NFR BLD: 8 % (ref 4–15)
NEUTROPHILS # BLD AUTO: 8.7 K/UL (ref 1.8–7.7)
NEUTROPHILS NFR BLD: 81.8 % (ref 38–73)
NRBC BLD-RTO: 0 /100 WBC
PHOSPHATE SERPL-MCNC: 3.2 MG/DL (ref 2.7–4.5)
PLATELET # BLD AUTO: 172 K/UL (ref 150–450)
PMV BLD AUTO: 10.9 FL (ref 9.2–12.9)
POTASSIUM SERPL-SCNC: 4.1 MMOL/L (ref 3.5–5.1)
PROT SERPL-MCNC: 6.2 G/DL (ref 6–8.4)
RBC # BLD AUTO: 3.94 M/UL (ref 4.6–6.2)
SODIUM SERPL-SCNC: 138 MMOL/L (ref 136–145)
WBC # BLD AUTO: 10.63 K/UL (ref 3.9–12.7)

## 2022-06-21 PROCEDURE — C9113 INJ PANTOPRAZOLE SODIUM, VIA: HCPCS | Performed by: HOSPITALIST

## 2022-06-21 PROCEDURE — 25000003 PHARM REV CODE 250: Performed by: SURGERY

## 2022-06-21 PROCEDURE — 36415 COLL VENOUS BLD VENIPUNCTURE: CPT | Performed by: STUDENT IN AN ORGANIZED HEALTH CARE EDUCATION/TRAINING PROGRAM

## 2022-06-21 PROCEDURE — 11000001 HC ACUTE MED/SURG PRIVATE ROOM

## 2022-06-21 PROCEDURE — 94799 UNLISTED PULMONARY SVC/PX: CPT

## 2022-06-21 PROCEDURE — 25000003 PHARM REV CODE 250: Performed by: STUDENT IN AN ORGANIZED HEALTH CARE EDUCATION/TRAINING PROGRAM

## 2022-06-21 PROCEDURE — 83735 ASSAY OF MAGNESIUM: CPT | Performed by: STUDENT IN AN ORGANIZED HEALTH CARE EDUCATION/TRAINING PROGRAM

## 2022-06-21 PROCEDURE — 84100 ASSAY OF PHOSPHORUS: CPT | Performed by: STUDENT IN AN ORGANIZED HEALTH CARE EDUCATION/TRAINING PROGRAM

## 2022-06-21 PROCEDURE — 63600175 PHARM REV CODE 636 W HCPCS: Performed by: SURGERY

## 2022-06-21 PROCEDURE — 85025 COMPLETE CBC W/AUTO DIFF WBC: CPT | Performed by: STUDENT IN AN ORGANIZED HEALTH CARE EDUCATION/TRAINING PROGRAM

## 2022-06-21 PROCEDURE — 63600175 PHARM REV CODE 636 W HCPCS: Performed by: HOSPITALIST

## 2022-06-21 PROCEDURE — 63600175 PHARM REV CODE 636 W HCPCS: Performed by: STUDENT IN AN ORGANIZED HEALTH CARE EDUCATION/TRAINING PROGRAM

## 2022-06-21 PROCEDURE — 80053 COMPREHEN METABOLIC PANEL: CPT | Performed by: STUDENT IN AN ORGANIZED HEALTH CARE EDUCATION/TRAINING PROGRAM

## 2022-06-21 RX ORDER — ENOXAPARIN SODIUM 100 MG/ML
40 INJECTION SUBCUTANEOUS EVERY 24 HOURS
Status: DISCONTINUED | OUTPATIENT
Start: 2022-06-21 | End: 2022-06-23 | Stop reason: HOSPADM

## 2022-06-21 RX ORDER — MUPIROCIN 20 MG/G
OINTMENT TOPICAL 2 TIMES DAILY
Status: DISCONTINUED | OUTPATIENT
Start: 2022-06-21 | End: 2022-06-23 | Stop reason: HOSPADM

## 2022-06-21 RX ORDER — LORAZEPAM 2 MG/ML
1 INJECTION INTRAMUSCULAR EVERY 6 HOURS
Status: DISCONTINUED | OUTPATIENT
Start: 2022-06-21 | End: 2022-06-23

## 2022-06-21 RX ORDER — PANTOPRAZOLE SODIUM 40 MG/10ML
40 INJECTION, POWDER, LYOPHILIZED, FOR SOLUTION INTRAVENOUS 2 TIMES DAILY
Status: DISCONTINUED | OUTPATIENT
Start: 2022-06-21 | End: 2022-06-23 | Stop reason: HOSPADM

## 2022-06-21 RX ADMIN — LORAZEPAM 1 MG: 2 INJECTION INTRAMUSCULAR; INTRAVENOUS at 08:06

## 2022-06-21 RX ADMIN — LORAZEPAM 1 MG: 2 INJECTION INTRAMUSCULAR; INTRAVENOUS at 06:06

## 2022-06-21 RX ADMIN — Medication: at 11:06

## 2022-06-21 RX ADMIN — FOLIC ACID 1 MG: 5 INJECTION, SOLUTION INTRAMUSCULAR; INTRAVENOUS; SUBCUTANEOUS at 01:06

## 2022-06-21 RX ADMIN — PANTOPRAZOLE SODIUM 40 MG: 40 INJECTION, POWDER, FOR SOLUTION INTRAVENOUS at 11:06

## 2022-06-21 RX ADMIN — MUPIROCIN: 20 OINTMENT TOPICAL at 11:06

## 2022-06-21 RX ADMIN — MUPIROCIN: 20 OINTMENT TOPICAL at 08:06

## 2022-06-21 RX ADMIN — ENOXAPARIN SODIUM 40 MG: 100 INJECTION SUBCUTANEOUS at 06:06

## 2022-06-21 RX ADMIN — THIAMINE HYDROCHLORIDE 500 MG: 100 INJECTION, SOLUTION INTRAMUSCULAR; INTRAVENOUS at 03:06

## 2022-06-21 RX ADMIN — THIAMINE HYDROCHLORIDE 500 MG: 100 INJECTION, SOLUTION INTRAMUSCULAR; INTRAVENOUS at 01:06

## 2022-06-21 RX ADMIN — PIPERACILLIN AND TAZOBACTAM 4.5 G: 4; .5 INJECTION, POWDER, FOR SOLUTION INTRAVENOUS at 08:06

## 2022-06-21 RX ADMIN — SODIUM CHLORIDE, SODIUM LACTATE, POTASSIUM CHLORIDE, AND CALCIUM CHLORIDE: .6; .31; .03; .02 INJECTION, SOLUTION INTRAVENOUS at 12:06

## 2022-06-21 RX ADMIN — FLUCONAZOLE 200 MG: 2 INJECTION, SOLUTION INTRAVENOUS at 11:06

## 2022-06-21 RX ADMIN — PIPERACILLIN AND TAZOBACTAM 4.5 G: 4; .5 INJECTION, POWDER, FOR SOLUTION INTRAVENOUS at 12:06

## 2022-06-21 RX ADMIN — PIPERACILLIN AND TAZOBACTAM 4.5 G: 4; .5 INJECTION, POWDER, FOR SOLUTION INTRAVENOUS at 04:06

## 2022-06-21 RX ADMIN — PIPERACILLIN AND TAZOBACTAM 4.5 G: 4; .5 INJECTION, POWDER, FOR SOLUTION INTRAVENOUS at 11:06

## 2022-06-21 RX ADMIN — PANTOPRAZOLE SODIUM 40 MG: 40 INJECTION, POWDER, FOR SOLUTION INTRAVENOUS at 08:06

## 2022-06-21 RX ADMIN — THIAMINE HYDROCHLORIDE 500 MG: 100 INJECTION, SOLUTION INTRAMUSCULAR; INTRAVENOUS at 08:06

## 2022-06-21 NOTE — PROGRESS NOTES
"[facility] Medicine  Progress Note    Patient Name: Tj Figueroa  MRN: [id number]  Patient Class: IP- Inpatient   Admission Date: 6/20/2022  Length of Stay: 1 days  Attending Physician: Jayy Rogers MD  Primary Care Provider: Primary Doctor No        Subjective:     Principal Problem:Acute gastric ulcer with perforation        HPI:  35 y/o male presented to the ER with abdominal pain.  Patient reported severe, persistent generalized abdominal pain for the last 2 hours.  Patient stating "I can't stand it".  No similar episodes in the past.  No alleviating or aggravating factors.  Patient admits to drinking a couple of beers prior to symptoms starting.  ETOH level of 243 on presentation.  CT showing pneumoperitoneum.  Surgery evaluated patient and taken for exp lap.  Hospital Medicine consulted for medical management and concern about possible alcohol withdrawal.      Overview/Hospital Course:  35 y/o male presented to the ER with abdominal pain.  Patient reported severe, persistent generalized abdominal pain for the last 2 hours.  Patient stating "I can't stand it".  No similar episodes in the past.  No alleviating or aggravating factors.  Patient admits to drinking a couple of beers prior to symptoms starting.  ETOH level of 243 on presentation.  CT showing pneumoperitoneum.  Surgery evaluated patient and taken for exp lap.  Hospital Medicine consulted for medical management and concern about possible alcohol withdrawal.S/P  ashley patch placement by surgery,on NG suction,  CC is abdominal pain.      History reviewed. No pertinent past medical history.    History reviewed. No pertinent surgical history.    Review of patient's allergies indicates:  No Known Allergies    No current facility-administered medications on file prior to encounter.     No current outpatient medications on file prior to encounter.     Family History    None       Tobacco Use    Smoking status: Not on file    " Smokeless tobacco: Not on file   Substance and Sexual Activity    Alcohol use: Not on file    Drug use: Not on file    Sexual activity: Not on file     Review of Systems   Constitutional:  Negative for chills and fever.   HENT:  Negative for ear discharge and ear pain.    Eyes:  Negative for discharge and itching.   Respiratory:  Negative for cough and shortness of breath.    Cardiovascular:  Negative for chest pain and leg swelling.   Gastrointestinal:  Positive for abdominal distention and abdominal pain.   Endocrine: Negative for cold intolerance and heat intolerance.   Genitourinary:  Negative for difficulty urinating and dysuria.   Musculoskeletal:  Negative for neck pain and neck stiffness.   Skin:  Negative for rash and wound.   Neurological:  Negative for seizures and syncope.   Psychiatric/Behavioral:  Negative for agitation and hallucinations.    Objective:     Vital Signs (Most Recent):  Temp: 97.4 °F (36.3 °C) (06/21/22 1129)  Pulse: (!) 59 (06/21/22 1129)  Resp: 16 (06/21/22 1129)  BP: (!) 140/93 (06/21/22 1129)  SpO2: 100 % (06/21/22 1129)   Vital Signs (24h Range):  Temp:  [97.4 °F (36.3 °C)-98.8 °F (37.1 °C)] 97.4 °F (36.3 °C)  Pulse:  [] 59  Resp:  [14-21] 16  SpO2:  [94 %-100 %] 100 %  BP: (128-148)/(77-99) 140/93     Weight: 61.6 kg (135 lb 12.9 oz)  Body mass index is 19.49 kg/m².    Physical Exam  Constitutional:       Appearance: He is ill-appearing. He is not diaphoretic.   HENT:      Head: Normocephalic and atraumatic.      Mouth/Throat:      Mouth: Mucous membranes are dry.      Pharynx: No oropharyngeal exudate or posterior oropharyngeal erythema.   Cardiovascular:      Rate and Rhythm: Regular rhythm. Tachycardia present.   Pulmonary:      Effort: No respiratory distress.      Breath sounds: Normal breath sounds.   Abdominal:      Palpations: Abdomen is soft.      Tenderness: There is abdominal tenderness.   Musculoskeletal:         General: No deformity or signs of injury.    Skin:     General: Skin is warm and dry.   Neurological:      Mental Status: He is oriented to person, place, and time.      Cranial Nerves: No cranial nerve deficit.       Significant Labs: All pertinent labs within the past 24 hours have been reviewed.  BMP:   Recent Labs   Lab 06/21/22  0438   *      K 4.1      CO2 26   BUN 7   CREATININE 0.7   CALCIUM 8.5*   MG 2.0       CBC:   Recent Labs   Lab 06/20/22  0511 06/20/22  0523 06/21/22  0438   WBC  --  14.05* 10.63   HGB  --  14.6 12.7*   HCT 45 42.6 38.0*   PLT  --  208 172         Significant Imaging: I have reviewed all pertinent imaging results/findings within the past 24 hours.    CC is abdominal pain.  Assessment/Plan:      * Acute gastric ulcer with perforation  Pneumoperitoneum on CT.  Admitted to General Surgery.    S/P  ashley patch placement by surgery,on NG suction.on PPI.    Leukocytosis  Secondary to above.      ETOH abuse  Patient seems to downplay his ETOH intake.  Alcohol level of 243 on presentation.  Started on thiamine and folate.  Start scheduled Valium with any signs of withdrawal.  IV Ativan  withdrawal symptoms.        VTE Risk Mitigation (From admission, onward)         Ordered     IP VTE HIGH RISK PATIENT  Once         06/20/22 0842     Place sequential compression device  Until discontinued         06/20/22 0842                Discharge Planning   CHRISTIANA:      Code Status: Full Code   Is the patient medically ready for discharge?:     Reason for patient still in hospital (select all that apply): Patient trending condition                     Lisa Uriarte MD  Department of Hospital Medicine   [facility] [place] [facility]

## 2022-06-21 NOTE — PROGRESS NOTES
Surgery Progress Note    S: NAEO. Pain controlled. Has not yet ambulated.    O:  Temp:  [97.4 °F (36.3 °C)-98.8 °F (37.1 °C)] 98.7 °F (37.1 °C)  Pulse:  [59-76] 69  Resp:  [14-18] 16  SpO2:  [97 %-100 %] 99 %  BP: (125-148)/(77-93) 125/85    I/O last 3 completed shifts:  In: 2800 [I.V.:1800; IV Piggyback:1000]  Out: 4910 [Urine:4725; Drains:185]  I/O this shift:  In: 0   Out: 60 [Drains:60]    Gen: awake, resting comfortably   HEENT: EOMI, NG bilious  CV: regular rate, warm and well perfused  Pulm: symmetric expansion, no distress  Abdomen: soft, appropriately ttp , nondistended, no rebound, no guarding, JAXON serosanguinous, dressing c/d/i  Extremities: moves all, no cyanosis  Skin: dry intact  Neuro: alert, no focal neuro deficits    WBC/Hgb/Hct/Plts:  10.63/12.7/38.0/172 (06/21 0438)  BUN/Cr/glu/ALT/AST/amyl/lip:  7/0.7/--/15/29/--/-- (06/21 0438)    Imaging Results          X-Ray Chest AP Portable (Final result)  Result time 06/20/22 07:32:51    Final result by Magdi Handy MD (06/20/22 07:32:51)                 Impression:      As above.      Electronically signed by: Magdi Handy  Date:    06/20/2022  Time:    07:32             Narrative:    EXAMINATION:  XR CHEST AP PORTABLE    CLINICAL HISTORY:  preop;    TECHNIQUE:  Single frontal view of the chest was performed.    COMPARISON:  Same day CT of the abdomen and pelvis.    FINDINGS:  Monitoring leads overlie the chest.  Grossly normal cardiomediastinal contours.  Lungs with some suggestion of dependent atelectasis.  No definite pneumothorax or large volume pleural effusion.    Suggested free air under the right hemidiaphragm in keeping with pneumoperitoneum established on prior CT imaging.  Osseous and soft tissue structures appear without definite acute abnormality.                                CT Abdomen Pelvis With Contrast (Final result)  Result time 06/20/22 06:56:45    Final result by Magdi Handy MD (06/20/22 06:56:45)                  Impression:      1. Pneumoperitoneum, portal venous gas, and free fluid overall concerning for perforated viscus.  The etiology of these findings is not definitively established on this examination.  The free air does appear to be somewhat concentrated in the upper abdomen about the left hepatic lobe and stomach raising the possibility of upper gastrointestinal source.  Clinical correlation and surgical consultation advised.  2. Additional details, as provided in the body of report.  This report was flagged in Epic as abnormal.      Electronically signed by: Magdi Handy  Date:    06/20/2022  Time:    06:56             Narrative:    EXAMINATION:  CT ABDOMEN PELVIS WITH CONTRAST    CLINICAL HISTORY:  Abdominal pain, acute, nonlocalized;    TECHNIQUE:  Low dose axial images, sagittal and coronal reformations were obtained from the lung bases to the pubic symphysis following the IV administration of 85 mL of Omnipaque 350    COMPARISON:  None.    FINDINGS:  Lower chest: Dependent atelectasis is noted.  Nonspecific vague ground-glass type attenuation additionally noted in the visualized lower lungs.    Liver: Unremarkable.  Small volume non dependent portal venous gas is suggested.    Gallbladder and bile ducts: Unremarkable. No biliary ductal dilatation.    Pancreas: Unremarkable.    Spleen: Unremarkable.    Adrenals: Unremarkable.    Kidneys: Unremarkable.    Lymph nodes: No abdominal or pelvic lymphadenopathy.    Bowel and mesentery: Small amount of nonspecific fluid noted within the visualized distal esophagus.  Nonspecific gastric wall thickening is identified.Appendix not clearly seen.    Abdominal aorta: Unremarkable.    Inferior vena cava: Unremarkable.    Free fluid or free air: Pneumoperitoneum is noted, somewhat most pronounced in the upper abdomen about the left hepatic lobe and stomach.  Small volume free fluid is additionally noted, collecting dependently within the pelvis.    Pelvis:  Unremarkable.    Body wall: Unremarkable.    Bones: Unremarkable.                                A/P:35 yo male who presents with abdominal pain. Imaging shows free air with concern for perforated viscus.   Now s/p ex lap abd washout and  Kan patch for perforated gastric ulcer on 6/20    Continue NG to LIWS  Strict NPO with IVF  Zosyn + fluconazole  Protonix IV BID  Ambulate. PT consulted  CIWA protocol and taper; appreciate Hospitalist recommendations/mgmt  Multi modal pain control  Encourage IS    Florinda, EITANs      N[place] RAMOS  Surgery PGY5

## 2022-06-21 NOTE — ASSESSMENT & PLAN NOTE
Pneumoperitoneum on CT.  Admitted to General Surgery.    S/P  ashley patch placement by surgery,on NG suction.on PPI.

## 2022-06-21 NOTE — SUBJECTIVE & OBJECTIVE
History reviewed. No pertinent past medical history.    History reviewed. No pertinent surgical history.    Review of patient's allergies indicates:  No Known Allergies    No current facility-administered medications on file prior to encounter.     No current outpatient medications on file prior to encounter.     Family History    None       Tobacco Use    Smoking status: Not on file    Smokeless tobacco: Not on file   Substance and Sexual Activity    Alcohol use: Not on file    Drug use: Not on file    Sexual activity: Not on file     Review of Systems   Constitutional:  Negative for chills and fever.   HENT:  Negative for ear discharge and ear pain.    Eyes:  Negative for discharge and itching.   Respiratory:  Negative for cough and shortness of breath.    Cardiovascular:  Negative for chest pain and leg swelling.   Gastrointestinal:  Positive for abdominal distention and abdominal pain.   Endocrine: Negative for cold intolerance and heat intolerance.   Genitourinary:  Negative for difficulty urinating and dysuria.   Musculoskeletal:  Negative for neck pain and neck stiffness.   Skin:  Negative for rash and wound.   Neurological:  Negative for seizures and syncope.   Psychiatric/Behavioral:  Negative for agitation and hallucinations.    Objective:     Vital Signs (Most Recent):  Temp: 97.4 °F (36.3 °C) (06/21/22 1129)  Pulse: (!) 59 (06/21/22 1129)  Resp: 16 (06/21/22 1129)  BP: (!) 140/93 (06/21/22 1129)  SpO2: 100 % (06/21/22 1129)   Vital Signs (24h Range):  Temp:  [97.4 °F (36.3 °C)-98.8 °F (37.1 °C)] 97.4 °F (36.3 °C)  Pulse:  [] 59  Resp:  [14-21] 16  SpO2:  [94 %-100 %] 100 %  BP: (128-148)/(77-99) 140/93     Weight: 61.6 kg (135 lb 12.9 oz)  Body mass index is 19.49 kg/m².    Physical Exam  Constitutional:       Appearance: He is ill-appearing. He is not diaphoretic.   HENT:      Head: Normocephalic and atraumatic.      Mouth/Throat:      Mouth: Mucous membranes are dry.      Pharynx: No  oropharyngeal exudate or posterior oropharyngeal erythema.   Cardiovascular:      Rate and Rhythm: Regular rhythm. Tachycardia present.   Pulmonary:      Effort: No respiratory distress.      Breath sounds: Normal breath sounds.   Abdominal:      Palpations: Abdomen is soft.      Tenderness: There is abdominal tenderness.   Musculoskeletal:         General: No deformity or signs of injury.   Skin:     General: Skin is warm and dry.   Neurological:      Mental Status: He is oriented to person, place, and time.      Cranial Nerves: No cranial nerve deficit.       Significant Labs: All pertinent labs within the past 24 hours have been reviewed.  BMP:   Recent Labs   Lab 06/21/22  0438   *      K 4.1      CO2 26   BUN 7   CREATININE 0.7   CALCIUM 8.5*   MG 2.0       CBC:   Recent Labs   Lab 06/20/22  0511 06/20/22  0523 06/21/22  0438   WBC  --  14.05* 10.63   HGB  --  14.6 12.7*   HCT 45 42.6 38.0*   PLT  --  208 172         Significant Imaging: I have reviewed all pertinent imaging results/findings within the past 24 hours.

## 2022-06-21 NOTE — PLAN OF CARE
06/21/22 1426   Discharge Assessment   Assessment Type Discharge Planning Assessment   Source of Information health record   Communicated CHRISTIANA with patient/caregiver Date not available/Unable to determine   Reason For Admission Acute Gastric Ulcer   Lives With alone   Facility Arrived From: Home   Prior to hospitilization cognitive status: Alert/Oriented   Current cognitive status: Alert/Oriented   Walking or Climbing Stairs Difficulty none   Dressing/Bathing Difficulty none   Equipment Currently Used at Home none   Readmission within 30 days? No   Patient currently being followed by outpatient case management? No   Do you currently have service(s) that help you manage your care at home? No   Do you take prescription medications? No   Do you have prescription coverage? No   Do you have any problems affording any of your prescribed medications? TBD   Who is going to help you get home at discharge? N/A   Are you on dialysis? No   Do you take coumadin? No   Discharge Plan A Home   Discharge Plan B Home   DME Needed Upon Discharge  none   Discharge Barriers Identified None

## 2022-06-21 NOTE — HOSPITAL COURSE
"35 y/o male presented to the ER with abdominal pain.  Patient reported severe, persistent generalized abdominal pain for the last 2 hours.  Patient stating "I can't stand it".  No similar episodes in the past.  No alleviating or aggravating factors.  Patient admits to drinking a couple of beers prior to symptoms starting.  ETOH level of 243 on presentation.  CT showing pneumoperitoneum.  Surgery evaluated patient and taken for exp lap.  Hospital Medicine consulted for medical management and concern about possible alcohol withdrawal.S/P  ashley patch placement by surgery,on NG suction,  On scheduled IV Ativan for alcohol withdraw,adjusted IVF,surgery stared on clear diet,switched medications  to PO.  CC is abdominal pain.  "

## 2022-06-21 NOTE — NURSING
Pt resting in bed asleep. AAO. No distress noted. Cont IVF. IV antibiotics infusing. Esparza to gravity. PCA pump. Pt on 1.5L O2 NC. NGT LIWS. Pt on tele #[id number]. JAXON drain. Abd dressing c/d/i. SCDs in place. Safety measures maintained. Bed alarm set. Call light within reach. Will cont to monitor

## 2022-06-22 PROBLEM — F14.10 COCAINE ABUSE: Status: ACTIVE | Noted: 2022-06-22

## 2022-06-22 PROBLEM — F12.10 MARIJUANA ABUSE: Status: ACTIVE | Noted: 2022-06-22

## 2022-06-22 LAB
ANION GAP SERPL CALC-SCNC: 10 MMOL/L (ref 8–16)
BASOPHILS # BLD AUTO: 0.03 K/UL (ref 0–0.2)
BASOPHILS NFR BLD: 0.3 % (ref 0–1.9)
BUN SERPL-MCNC: 8 MG/DL (ref 6–20)
CALCIUM SERPL-MCNC: 8.9 MG/DL (ref 8.7–10.5)
CHLORIDE SERPL-SCNC: 99 MMOL/L (ref 95–110)
CO2 SERPL-SCNC: 28 MMOL/L (ref 23–29)
CREAT SERPL-MCNC: 0.8 MG/DL (ref 0.5–1.4)
DIFFERENTIAL METHOD: ABNORMAL
EOSINOPHIL # BLD AUTO: 0 K/UL (ref 0–0.5)
EOSINOPHIL NFR BLD: 0.3 % (ref 0–8)
ERYTHROCYTE [DISTWIDTH] IN BLOOD BY AUTOMATED COUNT: 13 % (ref 11.5–14.5)
EST. GFR  (AFRICAN AMERICAN): >60 ML/MIN/1.73 M^2
EST. GFR  (NON AFRICAN AMERICAN): >60 ML/MIN/1.73 M^2
GLUCOSE SERPL-MCNC: 92 MG/DL (ref 70–110)
HCT VFR BLD AUTO: 40 % (ref 40–54)
HGB BLD-MCNC: 13.4 G/DL (ref 14–18)
IMM GRANULOCYTES # BLD AUTO: 0.02 K/UL (ref 0–0.04)
IMM GRANULOCYTES NFR BLD AUTO: 0.2 % (ref 0–0.5)
LYMPHOCYTES # BLD AUTO: 1.3 K/UL (ref 1–4.8)
LYMPHOCYTES NFR BLD: 13.2 % (ref 18–48)
MAGNESIUM SERPL-MCNC: 1.8 MG/DL (ref 1.6–2.6)
MCH RBC QN AUTO: 32 PG (ref 27–31)
MCHC RBC AUTO-ENTMCNC: 33.5 G/DL (ref 32–36)
MCV RBC AUTO: 96 FL (ref 82–98)
MONOCYTES # BLD AUTO: 0.6 K/UL (ref 0.3–1)
MONOCYTES NFR BLD: 6.2 % (ref 4–15)
NEUTROPHILS # BLD AUTO: 7.9 K/UL (ref 1.8–7.7)
NEUTROPHILS NFR BLD: 79.8 % (ref 38–73)
NRBC BLD-RTO: 0 /100 WBC
PHOSPHATE SERPL-MCNC: 3 MG/DL (ref 2.7–4.5)
PLATELET # BLD AUTO: 171 K/UL (ref 150–450)
PMV BLD AUTO: 10.9 FL (ref 9.2–12.9)
POCT GLUCOSE: 104 MG/DL (ref 70–110)
POTASSIUM SERPL-SCNC: 3.8 MMOL/L (ref 3.5–5.1)
RBC # BLD AUTO: 4.19 M/UL (ref 4.6–6.2)
SODIUM SERPL-SCNC: 137 MMOL/L (ref 136–145)
WBC # BLD AUTO: 9.85 K/UL (ref 3.9–12.7)

## 2022-06-22 PROCEDURE — 11000001 HC ACUTE MED/SURG PRIVATE ROOM

## 2022-06-22 PROCEDURE — 84100 ASSAY OF PHOSPHORUS: CPT | Performed by: STUDENT IN AN ORGANIZED HEALTH CARE EDUCATION/TRAINING PROGRAM

## 2022-06-22 PROCEDURE — 80048 BASIC METABOLIC PNL TOTAL CA: CPT | Performed by: STUDENT IN AN ORGANIZED HEALTH CARE EDUCATION/TRAINING PROGRAM

## 2022-06-22 PROCEDURE — C9113 INJ PANTOPRAZOLE SODIUM, VIA: HCPCS | Performed by: HOSPITALIST

## 2022-06-22 PROCEDURE — 85025 COMPLETE CBC W/AUTO DIFF WBC: CPT | Performed by: STUDENT IN AN ORGANIZED HEALTH CARE EDUCATION/TRAINING PROGRAM

## 2022-06-22 PROCEDURE — 63600175 PHARM REV CODE 636 W HCPCS: Performed by: STUDENT IN AN ORGANIZED HEALTH CARE EDUCATION/TRAINING PROGRAM

## 2022-06-22 PROCEDURE — 25000003 PHARM REV CODE 250: Performed by: STUDENT IN AN ORGANIZED HEALTH CARE EDUCATION/TRAINING PROGRAM

## 2022-06-22 PROCEDURE — 63600175 PHARM REV CODE 636 W HCPCS: Performed by: SURGERY

## 2022-06-22 PROCEDURE — 63600175 PHARM REV CODE 636 W HCPCS: Performed by: NURSE PRACTITIONER

## 2022-06-22 PROCEDURE — 97110 THERAPEUTIC EXERCISES: CPT

## 2022-06-22 PROCEDURE — 25500020 PHARM REV CODE 255: Performed by: SURGERY

## 2022-06-22 PROCEDURE — 36415 COLL VENOUS BLD VENIPUNCTURE: CPT | Performed by: STUDENT IN AN ORGANIZED HEALTH CARE EDUCATION/TRAINING PROGRAM

## 2022-06-22 PROCEDURE — 94799 UNLISTED PULMONARY SVC/PX: CPT

## 2022-06-22 PROCEDURE — 83735 ASSAY OF MAGNESIUM: CPT | Performed by: STUDENT IN AN ORGANIZED HEALTH CARE EDUCATION/TRAINING PROGRAM

## 2022-06-22 PROCEDURE — 25000003 PHARM REV CODE 250: Performed by: NURSE PRACTITIONER

## 2022-06-22 PROCEDURE — 97161 PT EVAL LOW COMPLEX 20 MIN: CPT

## 2022-06-22 PROCEDURE — 63600175 PHARM REV CODE 636 W HCPCS: Performed by: HOSPITALIST

## 2022-06-22 RX ORDER — DEXTROSE MONOHYDRATE AND SODIUM CHLORIDE 5; .9 G/100ML; G/100ML
INJECTION, SOLUTION INTRAVENOUS CONTINUOUS
Status: DISCONTINUED | OUTPATIENT
Start: 2022-06-22 | End: 2022-06-23

## 2022-06-22 RX ORDER — VANCOMYCIN HCL IN 5 % DEXTROSE 1G/250ML
1000 PLASTIC BAG, INJECTION (ML) INTRAVENOUS
Status: DISCONTINUED | OUTPATIENT
Start: 2022-06-22 | End: 2022-06-23

## 2022-06-22 RX ORDER — GLUCAGON 1 MG
1 KIT INJECTION
Status: DISCONTINUED | OUTPATIENT
Start: 2022-06-22 | End: 2022-06-23 | Stop reason: HOSPADM

## 2022-06-22 RX ORDER — INSULIN ASPART 100 [IU]/ML
0-5 INJECTION, SOLUTION INTRAVENOUS; SUBCUTANEOUS EVERY 6 HOURS PRN
Status: DISCONTINUED | OUTPATIENT
Start: 2022-06-22 | End: 2022-06-23 | Stop reason: HOSPADM

## 2022-06-22 RX ADMIN — Medication: at 11:06

## 2022-06-22 RX ADMIN — IOHEXOL 50 ML: 350 INJECTION, SOLUTION INTRAVENOUS at 02:06

## 2022-06-22 RX ADMIN — MUPIROCIN: 20 OINTMENT TOPICAL at 09:06

## 2022-06-22 RX ADMIN — PANTOPRAZOLE SODIUM 40 MG: 40 INJECTION, POWDER, FOR SOLUTION INTRAVENOUS at 09:06

## 2022-06-22 RX ADMIN — ENOXAPARIN SODIUM 40 MG: 100 INJECTION SUBCUTANEOUS at 04:06

## 2022-06-22 RX ADMIN — PIPERACILLIN AND TAZOBACTAM 4.5 G: 4; .5 INJECTION, POWDER, FOR SOLUTION INTRAVENOUS at 09:06

## 2022-06-22 RX ADMIN — FOLIC ACID 1 MG: 5 INJECTION, SOLUTION INTRAMUSCULAR; INTRAVENOUS; SUBCUTANEOUS at 09:06

## 2022-06-22 RX ADMIN — DEXTROSE AND SODIUM CHLORIDE: 5; .9 INJECTION, SOLUTION INTRAVENOUS at 08:06

## 2022-06-22 RX ADMIN — FLUCONAZOLE 200 MG: 2 INJECTION, SOLUTION INTRAVENOUS at 08:06

## 2022-06-22 RX ADMIN — PIPERACILLIN AND TAZOBACTAM 4.5 G: 4; .5 INJECTION, POWDER, FOR SOLUTION INTRAVENOUS at 04:06

## 2022-06-22 RX ADMIN — VANCOMYCIN HYDROCHLORIDE 1000 MG: 1 INJECTION, POWDER, LYOPHILIZED, FOR SOLUTION INTRAVENOUS at 03:06

## 2022-06-22 RX ADMIN — THIAMINE HYDROCHLORIDE 500 MG: 100 INJECTION, SOLUTION INTRAMUSCULAR; INTRAVENOUS at 05:06

## 2022-06-22 RX ADMIN — LORAZEPAM 1 MG: 2 INJECTION INTRAMUSCULAR; INTRAVENOUS at 01:06

## 2022-06-22 RX ADMIN — THIAMINE HYDROCHLORIDE 500 MG: 100 INJECTION, SOLUTION INTRAMUSCULAR; INTRAVENOUS at 11:06

## 2022-06-22 RX ADMIN — PANTOPRAZOLE SODIUM 40 MG: 40 INJECTION, POWDER, FOR SOLUTION INTRAVENOUS at 11:06

## 2022-06-22 RX ADMIN — THIAMINE HYDROCHLORIDE 500 MG: 100 INJECTION, SOLUTION INTRAMUSCULAR; INTRAVENOUS at 09:06

## 2022-06-22 RX ADMIN — VANCOMYCIN HYDROCHLORIDE 1500 MG: 1.5 INJECTION, POWDER, LYOPHILIZED, FOR SOLUTION INTRAVENOUS at 04:06

## 2022-06-22 RX ADMIN — DEXTROSE AND SODIUM CHLORIDE: 5; .9 INJECTION, SOLUTION INTRAVENOUS at 05:06

## 2022-06-22 NOTE — PROGRESS NOTES
Surgery Progress Note    S: NAEO. Pain controlled. No complaints.    O:  Temp:  [98.2 °F (36.8 °C)-98.9 °F (37.2 °C)] 98.8 °F (37.1 °C)  Pulse:  [58-89] 89  Resp:  [14-20] 18  SpO2:  [92 %-100 %] 97 %  BP: (125-144)/(79-91) 137/91    I/O last 3 completed shifts:  In: 1712.8 [I.V.:1241.8; IV Piggyback:471.1]  Out: 5920 [Urine:5825; Drains:95]  I/O this shift:  In: 0   Out: 300 [Urine:300]    Gen: awake, resting comfortably   HEENT: EOMI, NG bilious  CV: regular rate, warm and well perfused  Pulm: symmetric expansion, no distress  Abdomen: soft, appropriately ttp , nondistended, no rebound, no guarding, JAXON serosanguinous, dressing c/d/i  Extremities: moves all, no cyanosis  Skin: dry intact  Neuro: alert, no focal neuro deficits    WBC/Hgb/Hct/Plts:  9.85/13.4/40.0/171 (06/22 0431)  BUN/Cr/glu/ALT/AST/amyl/lip:  8/0.8/--/--/--/--/-- (06/22 0431)    Imaging Results          X-Ray Chest AP Portable (Final result)  Result time 06/20/22 07:32:51    Final result by Magdi Handy MD (06/20/22 07:32:51)                 Impression:      As above.      Electronically signed by: Magdi Handy  Date:    06/20/2022  Time:    07:32             Narrative:    EXAMINATION:  XR CHEST AP PORTABLE    CLINICAL HISTORY:  preop;    TECHNIQUE:  Single frontal view of the chest was performed.    COMPARISON:  Same day CT of the abdomen and pelvis.    FINDINGS:  Monitoring leads overlie the chest.  Grossly normal cardiomediastinal contours.  Lungs with some suggestion of dependent atelectasis.  No definite pneumothorax or large volume pleural effusion.    Suggested free air under the right hemidiaphragm in keeping with pneumoperitoneum established on prior CT imaging.  Osseous and soft tissue structures appear without definite acute abnormality.                                CT Abdomen Pelvis With Contrast (Final result)  Result time 06/20/22 06:56:45    Final result by Magdi Handy MD (06/20/22 06:56:45)                  Impression:      1. Pneumoperitoneum, portal venous gas, and free fluid overall concerning for perforated viscus.  The etiology of these findings is not definitively established on this examination.  The free air does appear to be somewhat concentrated in the upper abdomen about the left hepatic lobe and stomach raising the possibility of upper gastrointestinal source.  Clinical correlation and surgical consultation advised.  2. Additional details, as provided in the body of report.  This report was flagged in Epic as abnormal.      Electronically signed by: Magdi Handy  Date:    06/20/2022  Time:    06:56             Narrative:    EXAMINATION:  CT ABDOMEN PELVIS WITH CONTRAST    CLINICAL HISTORY:  Abdominal pain, acute, nonlocalized;    TECHNIQUE:  Low dose axial images, sagittal and coronal reformations were obtained from the lung bases to the pubic symphysis following the IV administration of 85 mL of Omnipaque 350    COMPARISON:  None.    FINDINGS:  Lower chest: Dependent atelectasis is noted.  Nonspecific vague ground-glass type attenuation additionally noted in the visualized lower lungs.    Liver: Unremarkable.  Small volume non dependent portal venous gas is suggested.    Gallbladder and bile ducts: Unremarkable. No biliary ductal dilatation.    Pancreas: Unremarkable.    Spleen: Unremarkable.    Adrenals: Unremarkable.    Kidneys: Unremarkable.    Lymph nodes: No abdominal or pelvic lymphadenopathy.    Bowel and mesentery: Small amount of nonspecific fluid noted within the visualized distal esophagus.  Nonspecific gastric wall thickening is identified.Appendix not clearly seen.    Abdominal aorta: Unremarkable.    Inferior vena cava: Unremarkable.    Free fluid or free air: Pneumoperitoneum is noted, somewhat most pronounced in the upper abdomen about the left hepatic lobe and stomach.  Small volume free fluid is additionally noted, collecting dependently within the pelvis.    Pelvis:  Unremarkable.    Body wall: Unremarkable.    Bones: Unremarkable.                                A/P:37 yo male who presents with abdominal pain. Imaging shows free air with concern for perforated viscus.   Now s/p ex lap abd washout and  Kan patch for perforated gastric ulcer on 6/20    Pt NG accidentally pulled out this afternoon; will order UGI with water soluble contrast to rule out extravasation  Strict NPO with IVF  Zosyn + fluconazole  Protonix IV BID  Ambulate. PT consulted  CIWA protocol and taper; appreciate Hospitalist recommendations/mgmt  Multi modal pain control  Encourage IS    Meri Neely MD  Surgery PGY5

## 2022-06-22 NOTE — PLAN OF CARE
Problem: Adult Inpatient Plan of Care  Goal: Plan of Care Review  Outcome: Ongoing, Progressing  Flowsheets (Taken 6/22/2022 0815)  Plan of Care Reviewed With: patient  Goal: Patient-Specific Goal (Individualized)  Outcome: Ongoing, Progressing     Problem: Fall Injury Risk  Goal: Absence of Fall and Fall-Related Injury  Outcome: Ongoing, Progressing  Intervention: Identify and Manage Contributors  Flowsheets (Taken 6/22/2022 0815)  Self-Care Promotion:   independence encouraged   safe use of adaptive equipment encouraged   BADL personal objects within reach  Medication Review/Management:   medications reviewed   high-risk medications identified   provider consulted     Problem: Adult Inpatient Plan of Care  Goal: Plan of Care Review  Outcome: Ongoing, Progressing  Flowsheets (Taken 6/22/2022 0815)  Plan of Care Reviewed With: patient     Problem: Adult Inpatient Plan of Care  Goal: Plan of Care Review  Outcome: Ongoing, Progressing  Flowsheets (Taken 6/22/2022 0815)  Plan of Care Reviewed With: patient     Problem: Adult Inpatient Plan of Care  Goal: Patient-Specific Goal (Individualized)  Outcome: Ongoing, Progressing     Problem: Adult Inpatient Plan of Care  Goal: Patient-Specific Goal (Individualized)  Outcome: Ongoing, Progressing     Problem: Fall Injury Risk  Goal: Absence of Fall and Fall-Related Injury  Outcome: Ongoing, Progressing  Intervention: Identify and Manage Contributors  Flowsheets (Taken 6/22/2022 0815)  Self-Care Promotion:   independence encouraged   safe use of adaptive equipment encouraged   BADL personal objects within reach  Medication Review/Management:   medications reviewed   high-risk medications identified   provider consulted     Problem: Fall Injury Risk  Goal: Absence of Fall and Fall-Related Injury  Outcome: Ongoing, Progressing     Problem: Fall Injury Risk  Goal: Absence of Fall and Fall-Related Injury  Intervention: Identify and Manage Contributors  Flowsheets (Taken  6/22/2022 0815)  Self-Care Promotion:   independence encouraged   safe use of adaptive equipment encouraged   BADL personal objects within reach  Medication Review/Management:   medications reviewed   high-risk medications identified   provider consulted     Problem: Fall Injury Risk  Goal: Absence of Fall and Fall-Related Injury  Intervention: Identify and Manage Contributors  Flowsheets (Taken 6/22/2022 0815)  Self-Care Promotion:   independence encouraged   safe use of adaptive equipment encouraged   BADL personal objects within reach  Medication Review/Management:   medications reviewed   high-risk medications identified   provider consulted

## 2022-06-22 NOTE — ASSESSMENT & PLAN NOTE
Patient seems to downplay his ETOH intake.  Alcohol level of 243 on presentation.  Started on thiamine and folate.    IV Ativan  withdrawal symptoms.patient is NPO.

## 2022-06-22 NOTE — PLAN OF CARE
Problem: Physical Therapy  Goal: Physical Therapy Goal  Description: Goals to be met by: 22     Patient will increase functional independence with mobility by performin. Supine to sit with [place]  2. Rolling to Left and Right with [place]vin  3. Sit to stand transfer with Garvin  4. Bed to chair transfer with [place]vin using No Assistive Device  5. Gait >500 feet with [place] using No Assistive Device  6. Upper/Lower extremity exercise program (within abdominal precautions) 2 sets x15 reps per handout, with independence    Outcome: Ongoing, Progressing    Pt ambulated ~500 ft with SBA pushing IV pole.

## 2022-06-22 NOTE — PT/OT/SLP EVAL
Physical Therapy Evaluation    Patient Name:  Tj Figueroa   MRN:  [id number]    Recommendations:     Discharge Recommendations:  home   Discharge Equipment Recommendations: none   Barriers to discharge: None    Assessment:     Tj Figueroa is a 36 y.o. male admitted with a medical diagnosis of Acute gastric ulcer with perforation.  He presents with the following impairments/functional limitations:  weakness, impaired endurance, impaired functional mobilty, pain, impaired skin.    Rehab Prognosis: Good; patient would benefit from acute skilled PT services to address these deficits and reach maximum level of function.    Recent Surgery: Procedure(s) (LRB):  LAPAROTOMY, EXPLORATORY (N/A) 2 Days Post-Op    Plan:     During this hospitalization, patient to be seen 3 x/week to address the identified rehab impairments via gait training, therapeutic activities, therapeutic exercises and progress toward the following goals:    · Plan of Care Expires:  07/06/22    Subjective     Chief Complaint: N/A  Patient/Family Comments/goals: Pt agreeable to therapy.   Pain/Comfort:  · Pain Rating 1:  (Pt reported that pain is less and tolerable.)  · Pain Addressed 1: Pre-medicate for activity (PCA pump)      Living Environment:  Pt lives in [place].   Prior to admission, patients level of function was independent.  Equipment used at home: none.  Upon discharge, patient will have assistance from ?.    Objective:     Communicated with nurse Quiroz prior to session.  Patient found HOB elevated with NG tube, oxygen, peripheral IV, telemetry, JAXON drain upon PT entry to room.    General Precautions: Standard, fall (abdominal precautions)   Orthopedic Precautions:N/A   Braces: N/A  Respiratory Status: Nasal cannula, flow 1 L/min    Exams:  · Cognitive Exam:  Patient was able to follow commands.   · Gross Motor Coordination:  WFL  · Postural Exam:  Patient presented with the following abnormalities:    · -       No postural abnormalities  identified  · Skin Integrity/Edema:      · -       Skin integrity: Visible skin intact and abdominal dressings intact/dry with JAXON drain present  · -       Edema: None noted BUE/BLE  · BUE ROM: WNL  · BUE Strength: WNL  · BLE ROM: WNL  · BLE Strength: WNL    Functional Mobility:  Pt Kinyarwanda speaking, able to understand simple English instructions.  Nurse Quiroz present to assist with translating.  Pt feeling better with less pain, able to tolerate therapy session without any s/s of distress.  spO2 on RA 98% and HR ~78 bpm.    · Bed Mobility:     · Rolling Right: stand by assistance  · Scooting: stand by assistance  · Supine to Sit: stand by assistance with HOB elevated   · Transfers:     · Sit to Stand:  stand by assistance with no AD  · Bed to Chair: stand by assistance with  no AD  using  Step Transfer  · Gait: Pt ambulated ~500 ft with SBA pushing IV pole.  Pt with mild forward flexed trunk, narrow EMMA, decreased step length, and decreased ce.  Pt has multiple lines and 2 IV poles.    · Balance: Pt with fair+ dynamic standing balance.     Therapeutic Activities and Exercises:  BUE seated therex x10 reps: shoulder flex, butterfly, and forward punches    BLE standing therex 2 sets x10 reps with B HHA: marches and calf raises    Pt educated on abdominal precautions s/p sx: log roll for bed mobility and no heavy lifting >5-10lbs.  Pt encouraged OOB>chair and ambulate in the hallway with nursing assistance 2* multiple IV poles.  Pt verbalized good understanding.  Nurse Quiroz updated with POC.      AM-PAC 6 CLICK MOBILITY  Total Score:21     Patient left up in chair with all lines intact, call button in reach and nurse Quiroz present.  NGT disconnected by charge nurse Elyssa and reconnected by nurse Quiroz.     GOALS:   Multidisciplinary Problems     Physical Therapy Goals        Problem: Physical Therapy    Goal Priority Disciplines Outcome Goal Variances Interventions   Physical Therapy Goal     PT, PT/OT Ongoing,  Progressing     Description: Goals to be met by: 22     Patient will increase functional independence with mobility by performin. Supine to sit with [place]  2. Rolling to Left and Right with [place]  3. Sit to stand transfer with [place]  4. Bed to chair transfer with [place] using No Assistive Device  5. Gait >500 feet with [place] using No Assistive Device  6. Upper/Lower extremity exercise program (within abdominal precautions) 2 sets x15 reps per handout, with independence                     History:     History reviewed. No pertinent past medical history.    History reviewed. No pertinent surgical history.    Time Tracking:     PT Received On: 22  PT Start Time: 1016     PT Stop Time: 1040  PT Total Time (min): 24 min     Billable Minutes: Evaluation 12 min and Therapeutic Exercise 12 min      2022

## 2022-06-22 NOTE — SUBJECTIVE & OBJECTIVE
History reviewed. No pertinent past medical history.    History reviewed. No pertinent surgical history.    Review of patient's allergies indicates:  No Known Allergies    No current facility-administered medications on file prior to encounter.     No current outpatient medications on file prior to encounter.     Family History    None       Tobacco Use    Smoking status: Not on file    Smokeless tobacco: Not on file   Substance and Sexual Activity    Alcohol use: Not on file    Drug use: Not on file    Sexual activity: Not on file     Review of Systems   Constitutional:  Negative for chills and fever.   HENT:  Negative for ear discharge and ear pain.    Eyes:  Negative for discharge and itching.   Respiratory:  Negative for cough and shortness of breath.    Cardiovascular:  Negative for chest pain and leg swelling.   Gastrointestinal:  Positive for abdominal distention and abdominal pain.   Endocrine: Negative for cold intolerance and heat intolerance.   Genitourinary:  Negative for difficulty urinating and dysuria.   Musculoskeletal:  Negative for neck pain and neck stiffness.   Skin:  Negative for rash and wound.   Neurological:  Negative for seizures and syncope.   Psychiatric/Behavioral:  Negative for agitation and hallucinations.    Objective:     Vital Signs (Most Recent):  Temp: 98.2 °F (36.8 °C) (06/22/22 0736)  Pulse: (!) 59 (06/22/22 0736)  Resp: 14 (06/22/22 0736)  BP: 127/85 (06/22/22 0736)  SpO2: 100 % (06/22/22 0736)   Vital Signs (24h Range):  Temp:  [97.4 °F (36.3 °C)-98.9 °F (37.2 °C)] 98.2 °F (36.8 °C)  Pulse:  [58-69] 59  Resp:  [14-20] 14  SpO2:  [92 %-100 %] 100 %  BP: (125-144)/(79-93) 127/85     Weight: 61.6 kg (135 lb 12.9 oz)  Body mass index is 19.49 kg/m².    Physical Exam  Constitutional:       Appearance: He is ill-appearing. He is not diaphoretic.   HENT:      Head: Normocephalic and atraumatic.      Mouth/Throat:      Mouth: Mucous membranes are dry.      Pharynx: No oropharyngeal  exudate or posterior oropharyngeal erythema.   Cardiovascular:      Rate and Rhythm: Regular rhythm. Tachycardia present.   Pulmonary:      Effort: No respiratory distress.      Breath sounds: Normal breath sounds.   Abdominal:      Palpations: Abdomen is soft.      Tenderness: There is abdominal tenderness.   Musculoskeletal:         General: No deformity or signs of injury.   Skin:     General: Skin is warm and dry.   Neurological:      Mental Status: He is oriented to person, place, and time.      Cranial Nerves: No cranial nerve deficit.       Significant Labs: All pertinent labs within the past 24 hours have been reviewed.  BMP:   Recent Labs   Lab 06/22/22  0431   GLU 92      K 3.8   CL 99   CO2 28   BUN 8   CREATININE 0.8   CALCIUM 8.9   MG 1.8       CBC:   Recent Labs   Lab 06/21/22 0438 06/22/22 0431   WBC 10.63 9.85   HGB 12.7* 13.4*   HCT 38.0* 40.0    171         Significant Imaging: I have reviewed all pertinent imaging results/findings within the past 24 hours.

## 2022-06-22 NOTE — PROGRESS NOTES
Pharmacokinetic Initial Assessment: IV Vancomycin    Assessment/Plan:    Initiate intravenous vancomycin with loading dose of 1500 mg once followed by a maintenance dose of vancomycin 1000mg IV every 12 hours  Desired empiric serum trough concentration is 10 to 20 mcg/mL  Draw vancomycin trough level 60 min prior to fourth dose on 6/23/22 at approximately 15:30  Pharmacy will continue to follow and monitor vancomycin.      Please contact pharmacy at extension [phone] with any questions regarding this assessment.     Thank you for the consult,   Tri Cabral       Patient brief summary:  Tj Figueroa is a 36 y.o. male initiated on antimicrobial therapy with IV Vancomycin for treatment of suspected bacteremia    Drug Allergies:   Review of patient's allergies indicates:  No Known Allergies    Actual Body Weight:   61.6 kg    Renal Function:   Estimated Creatinine Clearance: 128.3 mL/min (based on SCr of 0.7 mg/dL).,     Dialysis Method (if applicable):  N/A    CBC (last 72 hours):  Recent Labs   Lab Result Units 06/20/22  0523 06/21/22  0438   WBC K/uL 14.05* 10.63   Hemoglobin g/dL 14.6 12.7*   Hematocrit % 42.6 38.0*   Platelets K/uL 208 172   Gran % % 71.3 81.8*   Lymph % % 20.3 9.6*   Mono % % 6.5 8.0   Eosinophil % % 1.1 0.2   Basophil % % 0.4 0.1   Differential Method  Automated Automated       Metabolic Panel (last 72 hours):  Recent Labs   Lab Result Units 06/20/22  0523 06/20/22  0639 06/21/22  0438   Sodium mmol/L 141  --  138   Potassium mmol/L 3.9  --  4.1   Chloride mmol/L 104  --  103   CO2 mmol/L 23  --  26   Glucose mg/dL 107  --  111*   Glucose, UA   --  Negative  --    BUN mg/dL 7  --  7   Creatinine mg/dL 0.8  --  0.7   Creatinine, Urine mg/dL  --  78.9  --    Albumin g/dL 4.1  --  3.0*   Total Bilirubin mg/dL 0.7  --  1.6*   Alkaline Phosphatase U/L 80  --  65   AST U/L 42*  --  29   ALT U/L 19  --  15   Magnesium mg/dL  --   --  2.0   Phosphorus mg/dL  --   --  3.2       Drug levels (last 3  results):  No results for input(s): VANCOMYCINRA, VANCORANDOM, VANCOMYCINPE, VANCOPEAK, VANCOMYCINTR, VANCOTROUGH in the last 72 hours.    Microbiologic Results:  Microbiology Results (last 7 days)       Procedure Component Value Units Date/Time    Blood culture #1 **CANNOT BE ORDERED STAT** [[id number]] Collected: 06/20/22 0741    Order Status: Completed Specimen: Blood from Peripheral, Antecubital, Left Updated: 06/22/22 0158     Blood Culture, Routine Gram stain jaspreet bottle: Gram positive cocci in clusters resembling Staph      Results called to and read back by: JEOVANY Rod      06/22/2022 01:57 BML    Blood culture #2 **CANNOT BE ORDERED STAT** [[id number]] Collected: 06/20/22 0746    Order Status: Completed Specimen: Blood from Peripheral, Antecubital, Right Updated: 06/21/22 0903     Blood Culture, Routine No Growth to date      No Growth to date

## 2022-06-22 NOTE — PROGRESS NOTES
"[facility]  Progress Note    Patient Name: Tj Figueroa  MRN: [id number]  Patient Class: IP- Inpatient   Admission Date: 6/20/2022  Length of Stay: 2 days  Attending Physician: Jayy Rogers MD  Primary Care Provider: Primary Doctor No        Subjective:     Principal Problem:Acute gastric ulcer with perforation        HPI:  37 y/o male presented to the ER with abdominal pain.  Patient reported severe, persistent generalized abdominal pain for the last 2 hours.  Patient stating "I can't stand it".  No similar episodes in the past.  No alleviating or aggravating factors.  Patient admits to drinking a couple of beers prior to symptoms starting.  ETOH level of 243 on presentation.  CT showing pneumoperitoneum.  Surgery evaluated patient and taken for exp lap.  Hospital Medicine consulted for medical management and concern about possible alcohol withdrawal.      Overview/Hospital Course:  37 y/o male presented to the ER with abdominal pain.  Patient reported severe, persistent generalized abdominal pain for the last 2 hours.  Patient stating "I can't stand it".  No similar episodes in the past.  No alleviating or aggravating factors.  Patient admits to drinking a couple of beers prior to symptoms starting.  ETOH level of 243 on presentation.  CT showing pneumoperitoneum.  Surgery evaluated patient and taken for exp lap.  Hospital Medicine consulted for medical management and concern about possible alcohol withdrawal.S/P  ashley patch placement by surgery,on NG suction,  On scheduled IV Ativan for alcohol withdraw,adjusted IVF,remains NPO.  CC is abdominal pain.      History reviewed. No pertinent past medical history.    History reviewed. No pertinent surgical history.    Review of patient's allergies indicates:  No Known Allergies    No current facility-administered medications on file prior to encounter.     No current outpatient medications on file prior to encounter.     Family " History    None       Tobacco Use    Smoking status: Not on file    Smokeless tobacco: Not on file   Substance and Sexual Activity    Alcohol use: Not on file    Drug use: Not on file    Sexual activity: Not on file     Review of Systems   Constitutional:  Negative for chills and fever.   HENT:  Negative for ear discharge and ear pain.    Eyes:  Negative for discharge and itching.   Respiratory:  Negative for cough and shortness of breath.    Cardiovascular:  Negative for chest pain and leg swelling.   Gastrointestinal:  Positive for abdominal distention and abdominal pain.   Endocrine: Negative for cold intolerance and heat intolerance.   Genitourinary:  Negative for difficulty urinating and dysuria.   Musculoskeletal:  Negative for neck pain and neck stiffness.   Skin:  Negative for rash and wound.   Neurological:  Negative for seizures and syncope.   Psychiatric/Behavioral:  Negative for agitation and hallucinations.    Objective:     Vital Signs (Most Recent):  Temp: 98.2 °F (36.8 °C) (06/22/22 0736)  Pulse: (!) 59 (06/22/22 0736)  Resp: 14 (06/22/22 0736)  BP: 127/85 (06/22/22 0736)  SpO2: 100 % (06/22/22 0736)   Vital Signs (24h Range):  Temp:  [97.4 °F (36.3 °C)-98.9 °F (37.2 °C)] 98.2 °F (36.8 °C)  Pulse:  [58-69] 59  Resp:  [14-20] 14  SpO2:  [92 %-100 %] 100 %  BP: (125-144)/(79-93) 127/85     Weight: 61.6 kg (135 lb 12.9 oz)  Body mass index is 19.49 kg/m².    Physical Exam  Constitutional:       Appearance: He is ill-appearing. He is not diaphoretic.   HENT:      Head: Normocephalic and atraumatic.      Mouth/Throat:      Mouth: Mucous membranes are dry.      Pharynx: No oropharyngeal exudate or posterior oropharyngeal erythema.   Cardiovascular:      Rate and Rhythm: Regular rhythm. Tachycardia present.   Pulmonary:      Effort: No respiratory distress.      Breath sounds: Normal breath sounds.   Abdominal:      Palpations: Abdomen is soft.      Tenderness: There is abdominal tenderness.    Musculoskeletal:         General: No deformity or signs of injury.   Skin:     General: Skin is warm and dry.   Neurological:      Mental Status: He is oriented to person, place, and time.      Cranial Nerves: No cranial nerve deficit.       Significant Labs: All pertinent labs within the past 24 hours have been reviewed.  BMP:   Recent Labs   Lab 06/22/22  0431   GLU 92      K 3.8   CL 99   CO2 28   BUN 8   CREATININE 0.8   CALCIUM 8.9   MG 1.8       CBC:   Recent Labs   Lab 06/21/22  0438 06/22/22  0431   WBC 10.63 9.85   HGB 12.7* 13.4*   HCT 38.0* 40.0    171         Significant Imaging: I have reviewed all pertinent imaging results/findings within the past 24 hours.    CC is abdominal pain.  Assessment/Plan:      * Acute gastric ulcer with perforation  Pneumoperitoneum on CT.  Admitted to General Surgery.    S/P  ashley patch placement by surgery,on NG suction.on PPI.    Marijuana abuse  Consulted avoid illicit drug use,      Cocaine abuse  Consulted avoid illicit drug use,      Leukocytosis  Secondary to above.on empiric Zosyn and  Diflucan.      ETOH abuse  Patient seems to downplay his ETOH intake.  Alcohol level of 243 on presentation.  Started on thiamine and folate.    IV Ativan  withdrawal symptoms.patient is NPO.        VTE Risk Mitigation (From admission, onward)         Ordered     enoxaparin injection 40 mg  Daily         06/21/22 1721     IP VTE HIGH RISK PATIENT  Once         06/20/22 0842     Place sequential compression device  Until discontinued         06/20/22 0842                Discharge Planning   CHRISTIANA:      Code Status: Full Code   Is the patient medically ready for discharge?:     Reason for patient still in hospital (select all that apply): Patient trending condition  Discharge Plan A: Home                  Lisa Uriarte MD  Department of Hospital Medicine   [facility]

## 2022-06-23 VITALS
SYSTOLIC BLOOD PRESSURE: 123 MMHG | RESPIRATION RATE: 14 BRPM | HEIGHT: 70 IN | BODY MASS INDEX: 19.44 KG/M2 | OXYGEN SATURATION: 100 % | TEMPERATURE: 98 F | DIASTOLIC BLOOD PRESSURE: 88 MMHG | WEIGHT: 135.81 LBS | HEART RATE: 76 BPM

## 2022-06-23 LAB
ANION GAP SERPL CALC-SCNC: 8 MMOL/L (ref 8–16)
BASOPHILS # BLD AUTO: 0.02 K/UL (ref 0–0.2)
BASOPHILS NFR BLD: 0.3 % (ref 0–1.9)
BUN SERPL-MCNC: 5 MG/DL (ref 6–20)
CALCIUM SERPL-MCNC: 8.5 MG/DL (ref 8.7–10.5)
CHLORIDE SERPL-SCNC: 105 MMOL/L (ref 95–110)
CO2 SERPL-SCNC: 25 MMOL/L (ref 23–29)
CREAT SERPL-MCNC: 0.7 MG/DL (ref 0.5–1.4)
DIFFERENTIAL METHOD: ABNORMAL
EOSINOPHIL # BLD AUTO: 0.1 K/UL (ref 0–0.5)
EOSINOPHIL NFR BLD: 1.9 % (ref 0–8)
ERYTHROCYTE [DISTWIDTH] IN BLOOD BY AUTOMATED COUNT: 12.5 % (ref 11.5–14.5)
EST. GFR  (AFRICAN AMERICAN): >60 ML/MIN/1.73 M^2
EST. GFR  (NON AFRICAN AMERICAN): >60 ML/MIN/1.73 M^2
GLUCOSE SERPL-MCNC: 97 MG/DL (ref 70–110)
HCT VFR BLD AUTO: 38.7 % (ref 40–54)
HGB BLD-MCNC: 13.4 G/DL (ref 14–18)
IMM GRANULOCYTES # BLD AUTO: 0.02 K/UL (ref 0–0.04)
IMM GRANULOCYTES NFR BLD AUTO: 0.3 % (ref 0–0.5)
LYMPHOCYTES # BLD AUTO: 1.3 K/UL (ref 1–4.8)
LYMPHOCYTES NFR BLD: 19.8 % (ref 18–48)
MCH RBC QN AUTO: 32.8 PG (ref 27–31)
MCHC RBC AUTO-ENTMCNC: 34.6 G/DL (ref 32–36)
MCV RBC AUTO: 95 FL (ref 82–98)
MONOCYTES # BLD AUTO: 0.4 K/UL (ref 0.3–1)
MONOCYTES NFR BLD: 6.3 % (ref 4–15)
NEUTROPHILS # BLD AUTO: 4.5 K/UL (ref 1.8–7.7)
NEUTROPHILS NFR BLD: 71.4 % (ref 38–73)
NRBC BLD-RTO: 0 /100 WBC
PLATELET # BLD AUTO: 186 K/UL (ref 150–450)
PMV BLD AUTO: 10.6 FL (ref 9.2–12.9)
POCT GLUCOSE: 101 MG/DL (ref 70–110)
POCT GLUCOSE: 127 MG/DL (ref 70–110)
POCT GLUCOSE: 87 MG/DL (ref 70–110)
POTASSIUM SERPL-SCNC: 3.5 MMOL/L (ref 3.5–5.1)
RBC # BLD AUTO: 4.08 M/UL (ref 4.6–6.2)
SODIUM SERPL-SCNC: 138 MMOL/L (ref 136–145)
WBC # BLD AUTO: 6.36 K/UL (ref 3.9–12.7)

## 2022-06-23 PROCEDURE — 25000003 PHARM REV CODE 250: Performed by: NURSE PRACTITIONER

## 2022-06-23 PROCEDURE — 63600175 PHARM REV CODE 636 W HCPCS: Performed by: NURSE PRACTITIONER

## 2022-06-23 PROCEDURE — 97530 THERAPEUTIC ACTIVITIES: CPT

## 2022-06-23 PROCEDURE — C9113 INJ PANTOPRAZOLE SODIUM, VIA: HCPCS | Performed by: HOSPITALIST

## 2022-06-23 PROCEDURE — 25000003 PHARM REV CODE 250: Performed by: HOSPITALIST

## 2022-06-23 PROCEDURE — 63600175 PHARM REV CODE 636 W HCPCS: Performed by: HOSPITALIST

## 2022-06-23 PROCEDURE — 80048 BASIC METABOLIC PNL TOTAL CA: CPT | Performed by: STUDENT IN AN ORGANIZED HEALTH CARE EDUCATION/TRAINING PROGRAM

## 2022-06-23 PROCEDURE — 25000003 PHARM REV CODE 250: Performed by: STUDENT IN AN ORGANIZED HEALTH CARE EDUCATION/TRAINING PROGRAM

## 2022-06-23 PROCEDURE — 85025 COMPLETE CBC W/AUTO DIFF WBC: CPT | Performed by: STUDENT IN AN ORGANIZED HEALTH CARE EDUCATION/TRAINING PROGRAM

## 2022-06-23 PROCEDURE — 63600175 PHARM REV CODE 636 W HCPCS: Performed by: STUDENT IN AN ORGANIZED HEALTH CARE EDUCATION/TRAINING PROGRAM

## 2022-06-23 PROCEDURE — 36415 COLL VENOUS BLD VENIPUNCTURE: CPT | Performed by: STUDENT IN AN ORGANIZED HEALTH CARE EDUCATION/TRAINING PROGRAM

## 2022-06-23 RX ORDER — AMOXICILLIN 500 MG/1
1000 TABLET, FILM COATED ORAL EVERY 12 HOURS
Qty: 56 TABLET | Refills: 0 | Status: SHIPPED | OUTPATIENT
Start: 2022-06-23 | End: 2022-07-07

## 2022-06-23 RX ORDER — PANTOPRAZOLE SODIUM 40 MG/1
40 TABLET, DELAYED RELEASE ORAL DAILY
Qty: 30 TABLET | Refills: 1 | Status: SHIPPED | OUTPATIENT
Start: 2022-06-23 | End: 2022-06-23 | Stop reason: SDUPTHER

## 2022-06-23 RX ORDER — DIAZEPAM 5 MG/1
5 TABLET ORAL EVERY 8 HOURS
Status: DISCONTINUED | OUTPATIENT
Start: 2022-06-23 | End: 2022-06-23 | Stop reason: HOSPADM

## 2022-06-23 RX ORDER — LORAZEPAM 2 MG/ML
2 INJECTION INTRAMUSCULAR
Status: DISCONTINUED | OUTPATIENT
Start: 2022-06-23 | End: 2022-06-23

## 2022-06-23 RX ORDER — PANTOPRAZOLE SODIUM 40 MG/1
40 TABLET, DELAYED RELEASE ORAL 2 TIMES DAILY
Qty: 28 TABLET | Refills: 0 | Status: SHIPPED | OUTPATIENT
Start: 2022-06-23 | End: 2022-07-07

## 2022-06-23 RX ORDER — OXYCODONE AND ACETAMINOPHEN 5; 325 MG/1; MG/1
1 TABLET ORAL EVERY 6 HOURS PRN
Qty: 20 EACH | Refills: 0 | Status: SHIPPED | OUTPATIENT
Start: 2022-06-23

## 2022-06-23 RX ORDER — FLUCONAZOLE 100 MG/1
200 TABLET ORAL DAILY
Status: DISCONTINUED | OUTPATIENT
Start: 2022-06-24 | End: 2022-06-23 | Stop reason: HOSPADM

## 2022-06-23 RX ORDER — CLARITHROMYCIN 500 MG/1
500 TABLET, FILM COATED ORAL EVERY 12 HOURS
Qty: 28 TABLET | Refills: 0 | Status: SHIPPED | OUTPATIENT
Start: 2022-06-23 | End: 2022-07-07

## 2022-06-23 RX ORDER — PANTOPRAZOLE SODIUM 40 MG/1
40 TABLET, DELAYED RELEASE ORAL 2 TIMES DAILY
Qty: 28 TABLET | Refills: 0 | Status: SHIPPED | OUTPATIENT
Start: 2022-06-23 | End: 2022-06-23 | Stop reason: SDUPTHER

## 2022-06-23 RX ORDER — OXYCODONE HYDROCHLORIDE 5 MG/1
10 TABLET ORAL EVERY 4 HOURS PRN
Status: DISCONTINUED | OUTPATIENT
Start: 2022-06-23 | End: 2022-06-23 | Stop reason: HOSPADM

## 2022-06-23 RX ORDER — ONDANSETRON 4 MG/1
4 TABLET, ORALLY DISINTEGRATING ORAL 2 TIMES DAILY
Qty: 20 TABLET | Refills: 0 | Status: SHIPPED | OUTPATIENT
Start: 2022-06-23 | End: 2022-06-23 | Stop reason: SDUPTHER

## 2022-06-23 RX ORDER — ACETAMINOPHEN 650 MG/20.3ML
1000 LIQUID ORAL EVERY 8 HOURS
Status: DISCONTINUED | OUTPATIENT
Start: 2022-06-23 | End: 2022-06-23 | Stop reason: HOSPADM

## 2022-06-23 RX ORDER — PANTOPRAZOLE SODIUM 40 MG/1
40 TABLET, DELAYED RELEASE ORAL DAILY
Qty: 30 TABLET | Refills: 1 | Status: SHIPPED | OUTPATIENT
Start: 2022-07-08 | End: 2022-09-06

## 2022-06-23 RX ORDER — ONDANSETRON 4 MG/1
4 TABLET, ORALLY DISINTEGRATING ORAL EVERY 8 HOURS PRN
Qty: 20 TABLET | Refills: 0 | Status: SHIPPED | OUTPATIENT
Start: 2022-06-23

## 2022-06-23 RX ORDER — OXYCODONE HYDROCHLORIDE 5 MG/1
5 TABLET ORAL EVERY 4 HOURS PRN
Status: DISCONTINUED | OUTPATIENT
Start: 2022-06-23 | End: 2022-06-23 | Stop reason: HOSPADM

## 2022-06-23 RX ADMIN — LORAZEPAM 1 MG: 2 INJECTION INTRAMUSCULAR; INTRAVENOUS at 12:06

## 2022-06-23 RX ADMIN — THERA TABS 1 TABLET: TAB at 11:06

## 2022-06-23 RX ADMIN — FLUCONAZOLE 200 MG: 2 INJECTION, SOLUTION INTRAVENOUS at 08:06

## 2022-06-23 RX ADMIN — PIPERACILLIN AND TAZOBACTAM 4.5 G: 4; .5 INJECTION, POWDER, FOR SOLUTION INTRAVENOUS at 12:06

## 2022-06-23 RX ADMIN — PANTOPRAZOLE SODIUM 40 MG: 40 INJECTION, POWDER, FOR SOLUTION INTRAVENOUS at 08:06

## 2022-06-23 RX ADMIN — MUPIROCIN: 20 OINTMENT TOPICAL at 08:06

## 2022-06-23 RX ADMIN — PIPERACILLIN AND TAZOBACTAM 4.5 G: 4; .5 INJECTION, POWDER, FOR SOLUTION INTRAVENOUS at 09:06

## 2022-06-23 RX ADMIN — FOLIC ACID 1 MG: 5 INJECTION, SOLUTION INTRAMUSCULAR; INTRAVENOUS; SUBCUTANEOUS at 08:06

## 2022-06-23 RX ADMIN — VANCOMYCIN HYDROCHLORIDE 1000 MG: 1 INJECTION, POWDER, LYOPHILIZED, FOR SOLUTION INTRAVENOUS at 05:06

## 2022-06-23 NOTE — DISCHARGE SUMMARY
[facility]  Discharge Summary      Admit Date: 6/20/2022    Discharge Date and Time:  06/23/2022 9:35 PM    Attending Physician: Jayy Rogers MD     Reason for Admission: perforated gastric ulcer    Procedures Performed: Procedure(s) (LRB):  LAPAROTOMY, EXPLORATORY (N/A)    Hospital Course 35 yo male presented with perforated gastric ulcer and underwent exploratory laparotomy with Kan patch repair. He subsequently did well and UGI study showed no extravasation. He was started on a clear liquid diet and drain was removed the following morning. He is meeting all discharge criteria and will be discharged home with empiric treatment for H pylori and f/u with GI and surgery.    Goals of Care Treatment Preferences:  Code Status: Full Code      Consults: Hospital Medicine    Significant Diagnostic Studies: CT, labs    Final Diagnoses:    Principal Problem: Acute gastric ulcer with perforation   Secondary Diagnoses:   Active Hospital Problems    Diagnosis  POA    *Acute gastric ulcer with perforation [K25.1]  Yes    Cocaine abuse [F14.10]  Yes    Marijuana abuse [F12.10]  Yes    ETOH abuse [F10.10]  Yes     Chronic    Leukocytosis [D72.829]  Yes      Resolved Hospital Problems   No resolved problems to display.       Discharged Condition: good    Disposition: Home or Self Care    Follow Up/Patient Instructions:     Medications:  Reconciled Home Medications:      Medication List      START taking these medications    amoxicillin 500 MG Tab  Commonly known as: AMOXIL  Take 2 tablets (1,000 mg total) by mouth every 12 (twelve) hours. for 14 days     clarithromycin 500 MG tablet  Commonly known as: BIAXIN  Take 1 tablet (500 mg total) by mouth every 12 (twelve) hours. for 14 days     ondansetron 4 MG Tbdl  Commonly known as: ZOFRAN-ODT  Take 1 tablet (4 mg total) by mouth every 8 (eight) hours as needed (nausea).     oxyCODONE-acetaminophen 5-325 mg per tablet  Commonly known as: PERCOCET  Take 1 tablet  by mouth every 6 (six) hours as needed for Pain.     * pantoprazole 40 MG tablet  Commonly known as: PROTONIX  Take 1 tablet (40 mg total) by mouth 2 (two) times daily. for 14 days     * pantoprazole 40 MG tablet  Commonly known as: PROTONIX  Take 1 tablet (40 mg total) by mouth once daily.  Start taking on: July 8, 2022         * This list has 2 medication(s) that are the same as other medications prescribed for you. Read the directions carefully, and ask your doctor or other care provider to review them with you.              Discharge Procedure Orders   Ambulatory referral/consult to Gastroenterology   Standing Status: Future   Referral Priority: Routine Referral Type: Consultation   Referral Reason: Specialty Services Required   Requested Specialty: Gastroenterology   Number of Visits Requested: 1     Diet full liquid   Order Comments: Continue liquid diet/ soft diet for 2 days after discharge. Then okay to advance to regular diet as tolerated     Lifting restrictions   Order Comments: Do not lift anything heavier than 10 lbs for 6 weeks. Do not drive while taking prescription pain medications.     Notify your health care provider if you experience any of the following:  temperature >100.4     Notify your health care provider if you experience any of the following:  persistent nausea and vomiting or diarrhea     Notify your health care provider if you experience any of the following:  severe uncontrolled pain     Notify your health care provider if you experience any of the following:  redness, tenderness, or signs of infection (pain, swelling, redness, odor or green/yellow discharge around incision site)     Notify your health care provider if you experience any of the following:  difficulty breathing or increased cough     Notify your health care provider if you experience any of the following:  severe persistent headache     Notify your health care provider if you experience any of the following:  worsening  rash     Notify your health care provider if you experience any of the following:  persistent dizziness, light-headedness, or visual disturbances     Notify your health care provider if you experience any of the following:  increased confusion or weakness     Remove dressing in 48 hours   Order Comments: Keep incisions clean and dry. Remove dressing in 48 hours. Okay to shower in 48 hours. Let soap and water run over incision and pat to dry. Do not scrub incisions. Do not swim or soak in bath for at least 3 weeks.     Other restrictions (specify):   Order Comments: Avoid taking any over the counter NSAID medications: ibuprofen, aspirin, Motrin, Advil, Naproxen, Mobic, etc      Follow-up Information     Meng Payan MD. Schedule an appointment as soon as possible for a visit in 2 week(s).    Specialties: General Surgery, Surgery  Contact information:  [facility] [address] [place] [address] [phone]             GI Follow up.    Why: referral placed

## 2022-06-23 NOTE — PLAN OF CARE
[facility] - Med Surg  Discharge Final Note    Primary Care Provider: Primary Doctor No    Expected Discharge Date: 6/23/2022     All needs met. Post op appointment scheduled. Community resource given for [facility]. SW notified nurse Alexandria that patient is cleared from case management standpoint.     Final Discharge Note (most recent)     Final Note - 06/23/22 1255        Final Note    Assessment Type Final Discharge Note     Anticipated Discharge Disposition Home or Self Care     What phone number can be called within the next 1-3 days to see how you are doing after discharge? [phone]     Hospital Resources/Appts/Education Provided Appointments scheduled and added to AVS;Appointments scheduled by Navigator/Coordinator        Post-Acute Status    Post-Acute Authorization Other     Other Status No Post-Acute Service Needs     Discharge Delays None known at this time                 Important Message from Medicare             Contact Info     Meng Payan MD   Specialty: General Surgery, Surgery    [address] [place] [address]   Phone: [phone]       Next Steps: Schedule an appointment as soon as possible for a visit in 2 week(s)    GI        Next Steps: Follow up    Instructions: referral placed; Centralized scheduling will call you to schedule appointment with GI.    [facility]    [address]  [place] [address]   Phone: [phone]       Next Steps: Follow up    Instructions: Please call and schedule an apopintment with PCP.

## 2022-06-23 NOTE — NURSING
[facility] Nurses Note -- 4 Eyes      2022   11:49 PM      Skin assessed durin/22@2315      [] No Pressure Injuries Present    []Prevention Measures Documented      [x] Yes- Altered Skin Integrity Present or Discovered   [] LDA Added if Not in Epic (Describe Wound)   [x] New Altered Skin Integrity was Present on Admit and Documented in LDA   [] Wound Image Taken      Attending RN:  Kari Cardoso RN     Second RN/Staff Member: Radha Fenton RN

## 2022-06-23 NOTE — ASSESSMENT & PLAN NOTE
Pneumoperitoneum on CT.  Admitted to General Surgery.    S/P  ashley patch placement by surgery,was on NG suction.on PPI.surgery stared on clear diet,switched medications  to PO.

## 2022-06-23 NOTE — PROGRESS NOTES
"[facility]  Progress Note    Patient Name: Tj Figueroa  MRN: [id number]  Patient Class: IP- Inpatient   Admission Date: 6/20/2022  Length of Stay: 3 days  Attending Physician: Jayy Rogers MD  Primary Care Provider: Primary Doctor No        Subjective:     Principal Problem:Acute gastric ulcer with perforation        HPI:  37 y/o male presented to the ER with abdominal pain.  Patient reported severe, persistent generalized abdominal pain for the last 2 hours.  Patient stating "I can't stand it".  No similar episodes in the past.  No alleviating or aggravating factors.  Patient admits to drinking a couple of beers prior to symptoms starting.  ETOH level of 243 on presentation.  CT showing pneumoperitoneum.  Surgery evaluated patient and taken for exp lap.  Hospital Medicine consulted for medical management and concern about possible alcohol withdrawal.      Overview/Hospital Course:  37 y/o male presented to the ER with abdominal pain.  Patient reported severe, persistent generalized abdominal pain for the last 2 hours.  Patient stating "I can't stand it".  No similar episodes in the past.  No alleviating or aggravating factors.  Patient admits to drinking a couple of beers prior to symptoms starting.  ETOH level of 243 on presentation.  CT showing pneumoperitoneum.  Surgery evaluated patient and taken for exp lap.  Hospital Medicine consulted for medical management and concern about possible alcohol withdrawal.S/P  ashley patch placement by surgery,on NG suction,  On scheduled IV Ativan for alcohol withdraw,adjusted IVF,surgery stared on clear diet,switched medications  to PO.  CC is abdominal pain.      History reviewed. No pertinent past medical history.    History reviewed. No pertinent surgical history.    Review of patient's allergies indicates:  No Known Allergies    No current facility-administered medications on file prior to encounter.     No current outpatient medications " on file prior to encounter.     Family History    None       Tobacco Use    Smoking status: Not on file    Smokeless tobacco: Not on file   Substance and Sexual Activity    Alcohol use: Not on file    Drug use: Not on file    Sexual activity: Not on file     Review of Systems   Constitutional:  Negative for chills and fever.   HENT:  Negative for ear discharge and ear pain.    Eyes:  Negative for discharge and itching.   Respiratory:  Negative for cough and shortness of breath.    Cardiovascular:  Negative for chest pain and leg swelling.   Gastrointestinal:  Positive for abdominal distention and abdominal pain.   Endocrine: Negative for cold intolerance and heat intolerance.   Genitourinary:  Negative for difficulty urinating and dysuria.   Musculoskeletal:  Negative for neck pain and neck stiffness.   Skin:  Negative for rash and wound.   Neurological:  Negative for seizures and syncope.   Psychiatric/Behavioral:  Negative for agitation and hallucinations.    Objective:     Vital Signs (Most Recent):  Temp: 97.1 °F (36.2 °C) (06/23/22 0754)  Pulse: 67 (06/23/22 0754)  Resp: 18 (06/23/22 1043)  BP: 139/89 (06/23/22 0754)  SpO2: 100 % (06/23/22 0754)   Vital Signs (24h Range):  Temp:  [97.1 °F (36.2 °C)-99.4 °F (37.4 °C)] 97.1 °F (36.2 °C)  Pulse:  [57-89] 67  Resp:  [10-18] 18  SpO2:  [97 %-100 %] 100 %  BP: (122-139)/(73-91) 139/89     Weight: 61.6 kg (135 lb 12.9 oz)  Body mass index is 19.49 kg/m².    Physical Exam  Constitutional:       Appearance: He is ill-appearing. He is not diaphoretic.   HENT:      Head: Normocephalic and atraumatic.      Mouth/Throat:      Mouth: Mucous membranes are dry.      Pharynx: No oropharyngeal exudate or posterior oropharyngeal erythema.   Cardiovascular:      Rate and Rhythm: Regular rhythm. Tachycardia present.   Pulmonary:      Effort: No respiratory distress.      Breath sounds: Normal breath sounds.   Abdominal:      Palpations: Abdomen is soft.      Tenderness: There  is abdominal tenderness.   Musculoskeletal:         General: No deformity or signs of injury.   Skin:     General: Skin is warm and dry.   Neurological:      Mental Status: He is oriented to person, place, and time.      Cranial Nerves: No cranial nerve deficit.       Significant Labs: All pertinent labs within the past 24 hours have been reviewed.  BMP:   Recent Labs   Lab 06/22/22  0431 06/23/22  0758   GLU 92 97    138   K 3.8 3.5   CL 99 105   CO2 28 25   BUN 8 5*   CREATININE 0.8 0.7   CALCIUM 8.9 8.5*   MG 1.8  --        CBC:   Recent Labs   Lab 06/22/22  0431 06/23/22  0758   WBC 9.85 6.36   HGB 13.4* 13.4*   HCT 40.0 38.7*    186         Significant Imaging: I have reviewed all pertinent imaging results/findings within the past 24 hours.    CC is weakness  Assessment/Plan:      * Acute gastric ulcer with perforation  Pneumoperitoneum on CT.  Admitted to General Surgery.    S/P  ashley patch placement by surgery,was on NG suction.on PPI.surgery stared on clear diet,switched medications  to PO.    Marijuana abuse  Consulted avoid illicit drug use,      Cocaine abuse  Consulted avoid illicit drug use,      Leukocytosis  Secondary to above.on empiric Zosyn and  Diflucan.      ETOH abuse  Patient seems to downplay his ETOH intake.  Alcohol level of 243 on presentation.  Started on thiamine and folate.    IV Ativan  withdrawal symptoms.surgery stared on clear diet,switched medications  to PO.        VTE Risk Mitigation (From admission, onward)         Ordered     enoxaparin injection 40 mg  Daily         06/21/22 1721     IP VTE HIGH RISK PATIENT  Once         06/20/22 0842     Place sequential compression device  Until discontinued         06/20/22 0842                Discharge Planning   CHRISTIANA:      Code Status: Full Code   Is the patient medically ready for discharge?:     Reason for patient still in hospital (select all that apply): Patient trending condition  Discharge Plan A: Home                   Lisa Uriarte MD  Department of Hospital Medicine   [facility] [place] [facility]

## 2022-06-23 NOTE — ASSESSMENT & PLAN NOTE
Patient seems to downplay his ETOH intake.  Alcohol level of 243 on presentation.  Started on thiamine and folate.    IV Ativan  withdrawal symptoms.surgery stared on clear diet,switched medications  to PO.

## 2022-06-23 NOTE — PT/OT/SLP PROGRESS
Occupational Therapy      Patient Name:  Tj Figueroa   MRN:  [id number]    Initial OT eval received. D/w PT who reports that pt is independent with ambulation with no OT needs. OT to sign off. Please re-consult if situation changes. Thank you.     6/23/2022

## 2022-06-23 NOTE — PLAN OF CARE
Problem: Physical Therapy  Goal: Physical Therapy Goal  Description: Goals to be met by: 22     Patient will increase functional independence with mobility by performin. Supine to sit with [place]  2. Rolling to Left and Right with [place]  3. Sit to stand transfer with [place]  4. Bed to chair transfer with [place] using No Assistive Device  5. Gait >500 feet with [place] using No Assistive Device  6. Upper/Lower extremity exercise program (within abdominal precautions) 2 sets x15 reps per handout, with independence    Outcome: Met     Pt ambulated ~500 ft x2 trials with mod I pushing IV pole.

## 2022-06-23 NOTE — SUBJECTIVE & OBJECTIVE
History reviewed. No pertinent past medical history.    History reviewed. No pertinent surgical history.    Review of patient's allergies indicates:  No Known Allergies    No current facility-administered medications on file prior to encounter.     No current outpatient medications on file prior to encounter.     Family History    None       Tobacco Use    Smoking status: Not on file    Smokeless tobacco: Not on file   Substance and Sexual Activity    Alcohol use: Not on file    Drug use: Not on file    Sexual activity: Not on file     Review of Systems   Constitutional:  Negative for chills and fever.   HENT:  Negative for ear discharge and ear pain.    Eyes:  Negative for discharge and itching.   Respiratory:  Negative for cough and shortness of breath.    Cardiovascular:  Negative for chest pain and leg swelling.   Gastrointestinal:  Positive for abdominal distention and abdominal pain.   Endocrine: Negative for cold intolerance and heat intolerance.   Genitourinary:  Negative for difficulty urinating and dysuria.   Musculoskeletal:  Negative for neck pain and neck stiffness.   Skin:  Negative for rash and wound.   Neurological:  Negative for seizures and syncope.   Psychiatric/Behavioral:  Negative for agitation and hallucinations.    Objective:     Vital Signs (Most Recent):  Temp: 97.1 °F (36.2 °C) (06/23/22 0754)  Pulse: 67 (06/23/22 0754)  Resp: 18 (06/23/22 1043)  BP: 139/89 (06/23/22 0754)  SpO2: 100 % (06/23/22 0754)   Vital Signs (24h Range):  Temp:  [97.1 °F (36.2 °C)-99.4 °F (37.4 °C)] 97.1 °F (36.2 °C)  Pulse:  [57-89] 67  Resp:  [10-18] 18  SpO2:  [97 %-100 %] 100 %  BP: (122-139)/(73-91) 139/89     Weight: 61.6 kg (135 lb 12.9 oz)  Body mass index is 19.49 kg/m².    Physical Exam  Constitutional:       Appearance: He is ill-appearing. He is not diaphoretic.   HENT:      Head: Normocephalic and atraumatic.      Mouth/Throat:      Mouth: Mucous membranes are dry.      Pharynx: No oropharyngeal exudate  or posterior oropharyngeal erythema.   Cardiovascular:      Rate and Rhythm: Regular rhythm. Tachycardia present.   Pulmonary:      Effort: No respiratory distress.      Breath sounds: Normal breath sounds.   Abdominal:      Palpations: Abdomen is soft.      Tenderness: There is abdominal tenderness.   Musculoskeletal:         General: No deformity or signs of injury.   Skin:     General: Skin is warm and dry.   Neurological:      Mental Status: He is oriented to person, place, and time.      Cranial Nerves: No cranial nerve deficit.       Significant Labs: All pertinent labs within the past 24 hours have been reviewed.  BMP:   Recent Labs   Lab 06/22/22  0431 06/23/22  0758   GLU 92 97    138   K 3.8 3.5   CL 99 105   CO2 28 25   BUN 8 5*   CREATININE 0.8 0.7   CALCIUM 8.9 8.5*   MG 1.8  --        CBC:   Recent Labs   Lab 06/22/22  0431 06/23/22  0758   WBC 9.85 6.36   HGB 13.4* 13.4*   HCT 40.0 38.7*    186         Significant Imaging: I have reviewed all pertinent imaging results/findings within the past 24 hours.

## 2022-06-23 NOTE — PT/OT/SLP PROGRESS
Physical Therapy Treatment/D/C PT Services    Patient Name:  Tj Figueroa   MRN:  [id number]    Recommendations:     Discharge Recommendations:  home   Discharge Equipment Recommendations: none   Barriers to discharge: None    Assessment:     Tj Figueroa is a 36 y.o. male admitted with a medical diagnosis of Acute gastric ulcer with perforation.  He presents with the following impairments/functional limitations:pain, impaired skin.    Rehab Prognosis: Good  Recent Surgery: Procedure(s) (LRB):  LAPAROTOMY, EXPLORATORY (N/A) 3 Days Post-Op    Plan:     · Pt to continue ambulating in the hallway with nursing supervision while in the hospital.  Pt with no further needs for acute skilled PT services.      Subjective     Chief Complaint: N/A  Patient/Family Comments/goals: Pt agreeable to ambulation in the hallway.   Pain/Comfort:  Pain Rating 1:  (Pt with minimal abdominal pain.)  Pain Addressed 1: Cessation of Activity      Objective:     Communicated with nurse Ibrahim prior to session.  Patient found supine with peripheral IV, telemetry, JAXON drain upon PT entry to room.     General Precautions: Standard, fall (abdominal precautions)   Orthopedic Precautions:N/A   Braces: N/A  Respiratory Status: Room air     Functional Mobility:  · Bed Mobility:     · Rolling Left:  independence  · Scooting: independence  · Supine to Sit: independence  · Transfers:     · Sit to Stand:  modified independence with no AD  · Bed to Chair: modified independence with  no AD  using  Step Transfer  · Gait: Pt ambulated ~500 ft x2 trials with mod I pushing IV pole.  Pt with no major gait deviations.   · Balance: Pt with fair+ dynamic standing balance.      AM-PAC 6 CLICK MOBILITY  Turning over in bed (including adjusting bedclothes, sheets and blankets)?: 4  Sitting down on and standing up from a chair with arms (e.g., wheelchair, bedside commode, etc.): 4  Moving from lying on back to sitting on the side of the bed?: 4  Moving to and from a  bed to a chair (including a wheelchair)?: 4  Need to walk in hospital room?: 4  Climbing 3-5 steps with a railing?: 4  Basic Mobility Total Score: 24       Therapeutic Activities and Exercises:  Pt encouraged to continue to be OOB>chair and ambulate in the hallway with nursing supervision while in the hospital.  Pt verbalized good understanding.     Patient left up in chair with all lines intact and call button in reach.  Tray table in front.     GOALS:   Multidisciplinary Problems     Physical Therapy Goals     Not on file          Multidisciplinary Problems (Resolved)        Problem: Physical Therapy    Goal Priority Disciplines Outcome Goal Variances Interventions   Physical Therapy Goal   (Resolved)     PT, PT/OT Met     Description: Goals to be met by: 22     Patient will increase functional independence with mobility by performin. Supine to sit with [place]  2. Rolling to Left and Right with [place]  3. Sit to stand transfer with [place]  4. Bed to chair transfer with [place] using No Assistive Device  5. Gait >500 feet with [place] using No Assistive Device  6. Upper/Lower extremity exercise program (within abdominal precautions) 2 sets x15 reps per handout, with independence                     Time Tracking:     PT Received On: 22  PT Start Time: 1111     PT Stop Time: 1125  PT Total Time (min): 14 min     Billable Minutes: Therapeutic Activity 14 min    Treatment Type: Treatment              2022

## 2022-06-23 NOTE — NURSING
Pt is A&O x4, on 1L 02. PCA pump in place. Pt educated on administration. Pain level is 3. Will continue to monitor.

## 2022-06-23 NOTE — PLAN OF CARE
Pt using call light system when assistance is needed.     Problem: Adult Inpatient Plan of Care  Goal: Plan of Care Review  Outcome: Ongoing, Progressing  Goal: Patient-Specific Goal (Individualized)  Outcome: Ongoing, Progressing  Goal: Absence of Hospital-Acquired Illness or Injury  Outcome: Ongoing, Progressing  Goal: Optimal Comfort and Wellbeing  Outcome: Ongoing, Progressing  Goal: Readiness for Transition of Care  Outcome: Ongoing, Progressing

## 2022-06-23 NOTE — PHYSICIAN QUERY
PT Name: Tj Figueroa  MR #: [id number]     Documentation Clarification      CDS: Sonali MENDOZA RN  Contact information: [email or web address]      This form is a permanent document in the medical record.     Query Date: June 23, 2022    By submitting this query, we are merely seeking further clarification of documentation. Please utilize your independent clinical judgment when addressing the question(s) below.    The Medical Record reflects the following:    Supporting Clinical Findings Location in Medical Record   Free fluid or free air: Pneumoperitoneum is noted, somewhat most pronounced in the upper abdomen about the left hepatic lobe and stomach.  Small volume free fluid is additionally noted, collecting dependently within the pelvis.    Free air. Obvious peritonitis. Surgical emergency: to OR for ex lap and admission     Indication for procedure: Tj Figueroa is 36 y.o. male with  Difficult to obtain history due to acute intoxication presenting to the emergency room with acute abdominal pain peritonitis and a CT scan demonstrating free air in the abdomen consistent with perforated abdominal viscus organ likely a gastric ulcer.    Post-op Diagnosis:  Post-Op Diagnosis Codes:   perforated gastric ulcer  Procedure(s) (LRB):  LAPAROTOMY, EXPLORATORY (N/A)  Kan patch repair of perforated gastric ulcer   CT Abd Pelvis 6/20/2022        H&P Gen Surg 6/20/2022      Op Note 6/20/2022          Op Note 6/20/2022                                                                            Doctor, please further specify the documentation of peritonitis     [  x ] Generalized peritonitis    [   ] Other (please specify): ____________   [  ] Clinically undetermined

## 2022-06-23 NOTE — NURSING
Pt ready for discharge per order. PIVs removed, telemetry discontinued, AVS reviewed w/patient & paper prescriptions in hand. Transport called to take pt downstairs. Pt denies any questions. Pt leaving in stable condition with belongings in hand. No further needs at this time.

## 2022-06-24 LAB — BACTERIA BLD CULT: NORMAL

## 2022-06-25 LAB
BACTERIA BLD CULT: ABNORMAL

## (undated) DEVICE — TIP YANKAUERS BULB NO VENT

## (undated) DEVICE — APPLICATOR CHLORAPREP ORN 26ML

## (undated) DEVICE — PACK ENDOSCOPY GENERAL

## (undated) DEVICE — GLOVE SURG BIOGEL LATEX SZ 7.5

## (undated) DEVICE — BLANKET UPPER BODY 78.7X29.9IN

## (undated) DEVICE — SEE MEDLINE ITEM 157110

## (undated) DEVICE — DRAIN SIL FLAT 10MM FULL PERF

## (undated) DEVICE — SPONGE LAP 18X18 PREWASHED

## (undated) DEVICE — SOL 9P NACL IRR PIC IL

## (undated) DEVICE — DRAPE CORETEMP FLD WRM 56X62IN

## (undated) DEVICE — EVACUATOR WOUND BULB 100CC

## (undated) DEVICE — TOWEL OR DISP STRL BLUE 4/PK

## (undated) DEVICE — SUT VICRYL PLUS 3-0 SH 18IN

## (undated) DEVICE — ELECTRODE REM PLYHSV RETURN 9

## (undated) DEVICE — COVER OVERHEAD SURG LT BLUE

## (undated) DEVICE — SUT VICRYL 3-0 27 SH

## (undated) DEVICE — Device

## (undated) DEVICE — CLOSURE SKIN STERI STRIP 1/2X4

## (undated) DEVICE — TRAY FOLEY 16FR INFECTION CONT

## (undated) DEVICE — GAUZE SPONGE 4X4 12PLY

## (undated) DEVICE — SUT SILK 3-0 BLK BR SH 30IN

## (undated) DEVICE — SUT PDS PLUS 1 TP1 96IN

## (undated) DEVICE — SUT 1 36IN PDS II

## (undated) DEVICE — CANISTER SUCTION 2 LTR

## (undated) DEVICE — SUT 2-0 12-18IN SILK

## (undated) DEVICE — SUT SILK 3-0 SH 18IN BLACK